# Patient Record
Sex: MALE | Race: WHITE | NOT HISPANIC OR LATINO | URBAN - METROPOLITAN AREA
[De-identification: names, ages, dates, MRNs, and addresses within clinical notes are randomized per-mention and may not be internally consistent; named-entity substitution may affect disease eponyms.]

---

## 2018-09-08 ENCOUNTER — EMERGENCY (EMERGENCY)
Facility: HOSPITAL | Age: 68
LOS: 1 days | End: 2018-09-08
Payer: COMMERCIAL

## 2018-09-08 PROCEDURE — 71045 X-RAY EXAM CHEST 1 VIEW: CPT | Mod: 26

## 2018-09-08 PROCEDURE — 99285 EMERGENCY DEPT VISIT HI MDM: CPT

## 2018-12-18 ENCOUNTER — NON-APPOINTMENT (OUTPATIENT)
Age: 68
End: 2018-12-18

## 2018-12-18 ENCOUNTER — APPOINTMENT (OUTPATIENT)
Dept: PULMONOLOGY | Facility: CLINIC | Age: 68
End: 2018-12-18
Payer: COMMERCIAL

## 2018-12-18 VITALS
BODY MASS INDEX: 32.78 KG/M2 | WEIGHT: 204 LBS | HEART RATE: 77 BPM | DIASTOLIC BLOOD PRESSURE: 74 MMHG | SYSTOLIC BLOOD PRESSURE: 110 MMHG | HEIGHT: 66 IN | RESPIRATION RATE: 14 BRPM | OXYGEN SATURATION: 97 %

## 2018-12-18 DIAGNOSIS — Z00.00 ENCOUNTER FOR GENERAL ADULT MEDICAL EXAMINATION W/OUT ABNORMAL FINDINGS: ICD-10-CM

## 2018-12-18 DIAGNOSIS — E11.9 TYPE 2 DIABETES MELLITUS W/OUT COMPLICATIONS: ICD-10-CM

## 2018-12-18 DIAGNOSIS — E03.9 HYPOTHYROIDISM, UNSPECIFIED: ICD-10-CM

## 2018-12-18 DIAGNOSIS — E11.29 TYPE 2 DIABETES MELLITUS WITH OTHER DIABETIC KIDNEY COMPLICATION: ICD-10-CM

## 2018-12-18 PROCEDURE — 94010 BREATHING CAPACITY TEST: CPT

## 2018-12-18 PROCEDURE — 99204 OFFICE O/P NEW MOD 45 MIN: CPT | Mod: 25

## 2018-12-18 RX ORDER — MONTELUKAST SODIUM 10 MG/1
10 TABLET, FILM COATED ORAL
Refills: 0 | Status: ACTIVE | COMMUNITY

## 2018-12-18 RX ORDER — EMPAGLIFLOZIN 10 MG/1
10 TABLET, FILM COATED ORAL
Refills: 0 | Status: ACTIVE | COMMUNITY

## 2018-12-18 RX ORDER — OLOPATADINE HYDROCHLORIDE 665 UG/1
0.6 SPRAY, METERED NASAL
Qty: 1 | Refills: 2 | Status: ACTIVE | COMMUNITY
Start: 2018-12-18 | End: 1900-01-01

## 2018-12-18 RX ORDER — LEVOTHYROXINE SODIUM 75 UG/1
75 TABLET ORAL
Refills: 0 | Status: ACTIVE | COMMUNITY

## 2018-12-18 RX ORDER — LISINOPRIL 5 MG/1
5 TABLET ORAL
Refills: 0 | Status: ACTIVE | COMMUNITY

## 2019-01-01 ENCOUNTER — INPATIENT (INPATIENT)
Facility: HOSPITAL | Age: 69
LOS: 1 days | Discharge: LEFT BEFORE DISCHARGE | DRG: 251 | End: 2019-01-03
Attending: INTERNAL MEDICINE | Admitting: INTERNAL MEDICINE
Payer: MEDICARE

## 2019-01-01 VITALS
OXYGEN SATURATION: 99 % | HEART RATE: 92 BPM | RESPIRATION RATE: 19 BRPM | DIASTOLIC BLOOD PRESSURE: 120 MMHG | SYSTOLIC BLOOD PRESSURE: 174 MMHG

## 2019-01-01 DIAGNOSIS — I21.3 ST ELEVATION (STEMI) MYOCARDIAL INFARCTION OF UNSPECIFIED SITE: ICD-10-CM

## 2019-01-01 DIAGNOSIS — E03.9 HYPOTHYROIDISM, UNSPECIFIED: ICD-10-CM

## 2019-01-01 DIAGNOSIS — E11.9 TYPE 2 DIABETES MELLITUS WITHOUT COMPLICATIONS: ICD-10-CM

## 2019-01-01 DIAGNOSIS — J30.2 OTHER SEASONAL ALLERGIC RHINITIS: ICD-10-CM

## 2019-01-01 LAB
ALBUMIN SERPL ELPH-MCNC: 4.1 G/DL — SIGNIFICANT CHANGE UP (ref 3.3–5)
ALBUMIN SERPL ELPH-MCNC: 4.3 G/DL — SIGNIFICANT CHANGE UP (ref 3.3–5)
ALBUMIN SERPL ELPH-MCNC: 4.8 G/DL — SIGNIFICANT CHANGE UP (ref 3.3–5)
ALP SERPL-CCNC: 56 U/L — SIGNIFICANT CHANGE UP (ref 40–120)
ALP SERPL-CCNC: 59 U/L — SIGNIFICANT CHANGE UP (ref 40–120)
ALP SERPL-CCNC: 69 U/L — SIGNIFICANT CHANGE UP (ref 40–120)
ALT FLD-CCNC: 26 U/L — SIGNIFICANT CHANGE UP (ref 10–45)
ALT FLD-CCNC: 54 U/L — HIGH (ref 10–45)
ALT FLD-CCNC: 59 U/L — HIGH (ref 10–45)
ANION GAP SERPL CALC-SCNC: 13 MMOL/L — SIGNIFICANT CHANGE UP (ref 5–17)
ANION GAP SERPL CALC-SCNC: 15 MMOL/L — SIGNIFICANT CHANGE UP (ref 5–17)
ANION GAP SERPL CALC-SCNC: 18 MMOL/L — HIGH (ref 5–17)
APPEARANCE UR: CLEAR — SIGNIFICANT CHANGE UP
APTT BLD: 30.4 SEC — SIGNIFICANT CHANGE UP (ref 27.5–36.3)
AST SERPL-CCNC: 25 U/L — SIGNIFICANT CHANGE UP (ref 10–40)
AST SERPL-CCNC: 492 U/L — HIGH (ref 10–40)
AST SERPL-CCNC: 498 U/L — HIGH (ref 10–40)
BACTERIA # UR AUTO: NEGATIVE — SIGNIFICANT CHANGE UP
BASOPHILS # BLD AUTO: 0 K/UL — SIGNIFICANT CHANGE UP (ref 0–0.2)
BASOPHILS NFR BLD AUTO: 0.1 % — SIGNIFICANT CHANGE UP (ref 0–2)
BASOPHILS NFR BLD AUTO: 0.3 % — SIGNIFICANT CHANGE UP (ref 0–2)
BASOPHILS NFR BLD AUTO: 0.6 % — SIGNIFICANT CHANGE UP (ref 0–2)
BILIRUB SERPL-MCNC: 0.3 MG/DL — SIGNIFICANT CHANGE UP (ref 0.2–1.2)
BILIRUB SERPL-MCNC: 0.3 MG/DL — SIGNIFICANT CHANGE UP (ref 0.2–1.2)
BILIRUB SERPL-MCNC: 0.4 MG/DL — SIGNIFICANT CHANGE UP (ref 0.2–1.2)
BILIRUB UR-MCNC: NEGATIVE — SIGNIFICANT CHANGE UP
BLD GP AB SCN SERPL QL: NEGATIVE — SIGNIFICANT CHANGE UP
BUN SERPL-MCNC: 16 MG/DL — SIGNIFICANT CHANGE UP (ref 7–23)
BUN SERPL-MCNC: 20 MG/DL — SIGNIFICANT CHANGE UP (ref 7–23)
BUN SERPL-MCNC: 22 MG/DL — SIGNIFICANT CHANGE UP (ref 7–23)
CALCIUM SERPL-MCNC: 8.9 MG/DL — SIGNIFICANT CHANGE UP (ref 8.4–10.5)
CALCIUM SERPL-MCNC: 9 MG/DL — SIGNIFICANT CHANGE UP (ref 8.4–10.5)
CALCIUM SERPL-MCNC: 9.7 MG/DL — SIGNIFICANT CHANGE UP (ref 8.4–10.5)
CHLORIDE SERPL-SCNC: 104 MMOL/L — SIGNIFICANT CHANGE UP (ref 96–108)
CHLORIDE SERPL-SCNC: 106 MMOL/L — SIGNIFICANT CHANGE UP (ref 96–108)
CHLORIDE SERPL-SCNC: 107 MMOL/L — SIGNIFICANT CHANGE UP (ref 96–108)
CHOLEST SERPL-MCNC: 168 MG/DL — SIGNIFICANT CHANGE UP (ref 10–199)
CK MB BLD-MCNC: 8.5 % — HIGH (ref 0–3.5)
CK MB BLD-MCNC: 8.9 % — HIGH (ref 0–3.5)
CK MB CFR SERPL CALC: 312.4 NG/ML — HIGH (ref 0–6.7)
CK MB CFR SERPL CALC: 381.5 NG/ML — HIGH (ref 0–6.7)
CK SERPL-CCNC: 3666 U/L — HIGH (ref 30–200)
CK SERPL-CCNC: 4299 U/L — HIGH (ref 30–200)
CO2 SERPL-SCNC: 18 MMOL/L — LOW (ref 22–31)
CO2 SERPL-SCNC: 18 MMOL/L — LOW (ref 22–31)
CO2 SERPL-SCNC: 23 MMOL/L — SIGNIFICANT CHANGE UP (ref 22–31)
COLOR SPEC: SIGNIFICANT CHANGE UP
CREAT SERPL-MCNC: 0.69 MG/DL — SIGNIFICANT CHANGE UP (ref 0.5–1.3)
CREAT SERPL-MCNC: 0.78 MG/DL — SIGNIFICANT CHANGE UP (ref 0.5–1.3)
CREAT SERPL-MCNC: 0.97 MG/DL — SIGNIFICANT CHANGE UP (ref 0.5–1.3)
DIFF PNL FLD: ABNORMAL
EOSINOPHIL # BLD AUTO: 0 K/UL — SIGNIFICANT CHANGE UP (ref 0–0.5)
EOSINOPHIL # BLD AUTO: 0.1 K/UL — SIGNIFICANT CHANGE UP (ref 0–0.5)
EOSINOPHIL # BLD AUTO: 0.3 K/UL — SIGNIFICANT CHANGE UP (ref 0–0.5)
EOSINOPHIL NFR BLD AUTO: 0.3 % — SIGNIFICANT CHANGE UP (ref 0–6)
EOSINOPHIL NFR BLD AUTO: 0.8 % — SIGNIFICANT CHANGE UP (ref 0–6)
EOSINOPHIL NFR BLD AUTO: 3.3 % — SIGNIFICANT CHANGE UP (ref 0–6)
EPI CELLS # UR: 1 /HPF — SIGNIFICANT CHANGE UP
GLUCOSE SERPL-MCNC: 132 MG/DL — HIGH (ref 70–99)
GLUCOSE SERPL-MCNC: 138 MG/DL — HIGH (ref 70–99)
GLUCOSE SERPL-MCNC: 152 MG/DL — HIGH (ref 70–99)
GLUCOSE UR QL: ABNORMAL
HBA1C BLD-MCNC: 6.1 % — HIGH (ref 4–5.6)
HCT VFR BLD CALC: 47.1 % — SIGNIFICANT CHANGE UP (ref 39–50)
HCT VFR BLD CALC: 48.5 % — SIGNIFICANT CHANGE UP (ref 39–50)
HCT VFR BLD CALC: 49.9 % — SIGNIFICANT CHANGE UP (ref 39–50)
HDLC SERPL-MCNC: 43 MG/DL — SIGNIFICANT CHANGE UP
HGB BLD-MCNC: 16.2 G/DL — SIGNIFICANT CHANGE UP (ref 13–17)
HGB BLD-MCNC: 16.4 G/DL — SIGNIFICANT CHANGE UP (ref 13–17)
HGB BLD-MCNC: 16.5 G/DL — SIGNIFICANT CHANGE UP (ref 13–17)
HYALINE CASTS # UR AUTO: 2 /LPF — SIGNIFICANT CHANGE UP (ref 0–2)
INR BLD: 0.97 RATIO — SIGNIFICANT CHANGE UP (ref 0.88–1.16)
KETONES UR-MCNC: ABNORMAL
LEUKOCYTE ESTERASE UR-ACNC: NEGATIVE — SIGNIFICANT CHANGE UP
LIPID PNL WITH DIRECT LDL SERPL: 93 MG/DL — SIGNIFICANT CHANGE UP
LYMPHOCYTES # BLD AUTO: 0.6 K/UL — LOW (ref 1–3.3)
LYMPHOCYTES # BLD AUTO: 1.5 K/UL — SIGNIFICANT CHANGE UP (ref 1–3.3)
LYMPHOCYTES # BLD AUTO: 13.5 % — SIGNIFICANT CHANGE UP (ref 13–44)
LYMPHOCYTES # BLD AUTO: 2 K/UL — SIGNIFICANT CHANGE UP (ref 1–3.3)
LYMPHOCYTES # BLD AUTO: 27 % — SIGNIFICANT CHANGE UP (ref 13–44)
LYMPHOCYTES # BLD AUTO: 6 % — LOW (ref 13–44)
MAGNESIUM SERPL-MCNC: 2 MG/DL — SIGNIFICANT CHANGE UP (ref 1.6–2.6)
MAGNESIUM SERPL-MCNC: 2 MG/DL — SIGNIFICANT CHANGE UP (ref 1.6–2.6)
MCHC RBC-ENTMCNC: 30.6 PG — SIGNIFICANT CHANGE UP (ref 27–34)
MCHC RBC-ENTMCNC: 31.2 PG — SIGNIFICANT CHANGE UP (ref 27–34)
MCHC RBC-ENTMCNC: 31.4 PG — SIGNIFICANT CHANGE UP (ref 27–34)
MCHC RBC-ENTMCNC: 33 GM/DL — SIGNIFICANT CHANGE UP (ref 32–36)
MCHC RBC-ENTMCNC: 33.9 GM/DL — SIGNIFICANT CHANGE UP (ref 32–36)
MCHC RBC-ENTMCNC: 34.4 GM/DL — SIGNIFICANT CHANGE UP (ref 32–36)
MCV RBC AUTO: 91.4 FL — SIGNIFICANT CHANGE UP (ref 80–100)
MCV RBC AUTO: 91.8 FL — SIGNIFICANT CHANGE UP (ref 80–100)
MCV RBC AUTO: 92.6 FL — SIGNIFICANT CHANGE UP (ref 80–100)
MONOCYTES # BLD AUTO: 0.6 K/UL — SIGNIFICANT CHANGE UP (ref 0–0.9)
MONOCYTES # BLD AUTO: 0.7 K/UL — SIGNIFICANT CHANGE UP (ref 0–0.9)
MONOCYTES # BLD AUTO: 0.8 K/UL — SIGNIFICANT CHANGE UP (ref 0–0.9)
MONOCYTES NFR BLD AUTO: 5.8 % — SIGNIFICANT CHANGE UP (ref 2–14)
MONOCYTES NFR BLD AUTO: 7.1 % — SIGNIFICANT CHANGE UP (ref 2–14)
MONOCYTES NFR BLD AUTO: 9.9 % — SIGNIFICANT CHANGE UP (ref 2–14)
NEUTROPHILS # BLD AUTO: 4.5 K/UL — SIGNIFICANT CHANGE UP (ref 1.8–7.4)
NEUTROPHILS # BLD AUTO: 8.9 K/UL — HIGH (ref 1.8–7.4)
NEUTROPHILS # BLD AUTO: 9.3 K/UL — HIGH (ref 1.8–7.4)
NEUTROPHILS NFR BLD AUTO: 59.3 % — SIGNIFICANT CHANGE UP (ref 43–77)
NEUTROPHILS NFR BLD AUTO: 78.3 % — HIGH (ref 43–77)
NEUTROPHILS NFR BLD AUTO: 87.9 % — HIGH (ref 43–77)
NITRITE UR-MCNC: NEGATIVE — SIGNIFICANT CHANGE UP
PCP SPEC-MCNC: SIGNIFICANT CHANGE UP
PH UR: 5.5 — SIGNIFICANT CHANGE UP (ref 5–8)
PHOSPHATE SERPL-MCNC: 2.7 MG/DL — SIGNIFICANT CHANGE UP (ref 2.5–4.5)
PHOSPHATE SERPL-MCNC: 2.9 MG/DL — SIGNIFICANT CHANGE UP (ref 2.5–4.5)
PLATELET # BLD AUTO: 180 K/UL — SIGNIFICANT CHANGE UP (ref 150–400)
PLATELET # BLD AUTO: 198 K/UL — SIGNIFICANT CHANGE UP (ref 150–400)
PLATELET # BLD AUTO: 211 K/UL — SIGNIFICANT CHANGE UP (ref 150–400)
POTASSIUM SERPL-MCNC: 3.5 MMOL/L — SIGNIFICANT CHANGE UP (ref 3.5–5.3)
POTASSIUM SERPL-MCNC: 4 MMOL/L — SIGNIFICANT CHANGE UP (ref 3.5–5.3)
POTASSIUM SERPL-MCNC: 4 MMOL/L — SIGNIFICANT CHANGE UP (ref 3.5–5.3)
POTASSIUM SERPL-SCNC: 3.5 MMOL/L — SIGNIFICANT CHANGE UP (ref 3.5–5.3)
POTASSIUM SERPL-SCNC: 4 MMOL/L — SIGNIFICANT CHANGE UP (ref 3.5–5.3)
POTASSIUM SERPL-SCNC: 4 MMOL/L — SIGNIFICANT CHANGE UP (ref 3.5–5.3)
PROT SERPL-MCNC: 6.7 G/DL — SIGNIFICANT CHANGE UP (ref 6–8.3)
PROT SERPL-MCNC: 6.8 G/DL — SIGNIFICANT CHANGE UP (ref 6–8.3)
PROT SERPL-MCNC: 7.4 G/DL — SIGNIFICANT CHANGE UP (ref 6–8.3)
PROT UR-MCNC: ABNORMAL
PROTHROM AB SERPL-ACNC: 11 SEC — SIGNIFICANT CHANGE UP (ref 10–12.9)
RBC # BLD: 5.15 M/UL — SIGNIFICANT CHANGE UP (ref 4.2–5.8)
RBC # BLD: 5.28 M/UL — SIGNIFICANT CHANGE UP (ref 4.2–5.8)
RBC # BLD: 5.38 M/UL — SIGNIFICANT CHANGE UP (ref 4.2–5.8)
RBC # FLD: 13.2 % — SIGNIFICANT CHANGE UP (ref 10.3–14.5)
RBC # FLD: 13.3 % — SIGNIFICANT CHANGE UP (ref 10.3–14.5)
RBC # FLD: 13.6 % — SIGNIFICANT CHANGE UP (ref 10.3–14.5)
RBC CASTS # UR COMP ASSIST: 1 /HPF — SIGNIFICANT CHANGE UP (ref 0–4)
RH IG SCN BLD-IMP: POSITIVE — SIGNIFICANT CHANGE UP
SODIUM SERPL-SCNC: 138 MMOL/L — SIGNIFICANT CHANGE UP (ref 135–145)
SODIUM SERPL-SCNC: 139 MMOL/L — SIGNIFICANT CHANGE UP (ref 135–145)
SODIUM SERPL-SCNC: 145 MMOL/L — SIGNIFICANT CHANGE UP (ref 135–145)
SP GR SPEC: >1.05 (ref 1.01–1.02)
TOTAL CHOLESTEROL/HDL RATIO MEASUREMENT: 3.9 RATIO — SIGNIFICANT CHANGE UP (ref 3.4–9.6)
TRIGL SERPL-MCNC: 162 MG/DL — HIGH (ref 10–149)
TROPONIN T, HIGH SENSITIVITY RESULT: 11 NG/L — SIGNIFICANT CHANGE UP (ref 0–51)
TROPONIN T, HIGH SENSITIVITY RESULT: 6435 NG/L — HIGH (ref 0–51)
TROPONIN T, HIGH SENSITIVITY RESULT: 8141 NG/L — HIGH (ref 0–51)
TSH SERPL-MCNC: 3.92 UIU/ML — SIGNIFICANT CHANGE UP (ref 0.27–4.2)
UROBILINOGEN FLD QL: NEGATIVE — SIGNIFICANT CHANGE UP
WBC # BLD: 10.6 K/UL — HIGH (ref 3.8–10.5)
WBC # BLD: 11.4 K/UL — HIGH (ref 3.8–10.5)
WBC # BLD: 7.6 K/UL — SIGNIFICANT CHANGE UP (ref 3.8–10.5)
WBC # FLD AUTO: 10.6 K/UL — HIGH (ref 3.8–10.5)
WBC # FLD AUTO: 11.4 K/UL — HIGH (ref 3.8–10.5)
WBC # FLD AUTO: 7.6 K/UL — SIGNIFICANT CHANGE UP (ref 3.8–10.5)
WBC UR QL: 2 /HPF — SIGNIFICANT CHANGE UP (ref 0–5)

## 2019-01-01 PROCEDURE — 70498 CT ANGIOGRAPHY NECK: CPT | Mod: 26

## 2019-01-01 PROCEDURE — 70496 CT ANGIOGRAPHY HEAD: CPT | Mod: 26

## 2019-01-01 PROCEDURE — 92921: CPT | Mod: LD,GC

## 2019-01-01 PROCEDURE — 70450 CT HEAD/BRAIN W/O DYE: CPT | Mod: 26,59

## 2019-01-01 PROCEDURE — 99285 EMERGENCY DEPT VISIT HI MDM: CPT | Mod: 25

## 2019-01-01 PROCEDURE — 99152 MOD SED SAME PHYS/QHP 5/>YRS: CPT | Mod: GC

## 2019-01-01 PROCEDURE — 93010 ELECTROCARDIOGRAM REPORT: CPT | Mod: 76

## 2019-01-01 PROCEDURE — 92978 ENDOLUMINL IVUS OCT C 1ST: CPT | Mod: 26,LD,GC

## 2019-01-01 PROCEDURE — 93010 ELECTROCARDIOGRAM REPORT: CPT | Mod: 59

## 2019-01-01 PROCEDURE — 92941 PRQ TRLML REVSC TOT OCCL AMI: CPT | Mod: LD,GC

## 2019-01-01 PROCEDURE — 99291 CRITICAL CARE FIRST HOUR: CPT

## 2019-01-01 PROCEDURE — 93458 L HRT ARTERY/VENTRICLE ANGIO: CPT | Mod: 26,59,GC

## 2019-01-01 PROCEDURE — 71045 X-RAY EXAM CHEST 1 VIEW: CPT | Mod: 26

## 2019-01-01 RX ORDER — SODIUM CHLORIDE 9 MG/ML
500 INJECTION INTRAMUSCULAR; INTRAVENOUS; SUBCUTANEOUS ONCE
Qty: 0 | Refills: 0 | Status: DISCONTINUED | OUTPATIENT
Start: 2019-01-01 | End: 2019-01-01

## 2019-01-01 RX ORDER — TICAGRELOR 90 MG/1
90 TABLET ORAL
Qty: 0 | Refills: 0 | Status: DISCONTINUED | OUTPATIENT
Start: 2019-01-02 | End: 2019-01-03

## 2019-01-01 RX ORDER — SODIUM CHLORIDE 9 MG/ML
250 INJECTION INTRAMUSCULAR; INTRAVENOUS; SUBCUTANEOUS ONCE
Qty: 0 | Refills: 0 | Status: COMPLETED | OUTPATIENT
Start: 2019-01-01 | End: 2019-01-01

## 2019-01-01 RX ORDER — ATORVASTATIN CALCIUM 80 MG/1
80 TABLET, FILM COATED ORAL AT BEDTIME
Qty: 0 | Refills: 0 | Status: DISCONTINUED | OUTPATIENT
Start: 2019-01-01 | End: 2019-01-01

## 2019-01-01 RX ORDER — ROSUVASTATIN CALCIUM 5 MG/1
40 TABLET ORAL AT BEDTIME
Qty: 0 | Refills: 0 | Status: DISCONTINUED | OUTPATIENT
Start: 2019-01-01 | End: 2019-01-03

## 2019-01-01 RX ORDER — MONTELUKAST 4 MG/1
10 TABLET, CHEWABLE ORAL DAILY
Qty: 0 | Refills: 0 | Status: DISCONTINUED | OUTPATIENT
Start: 2019-01-01 | End: 2019-01-03

## 2019-01-01 RX ORDER — TICAGRELOR 90 MG/1
180 TABLET ORAL ONCE
Qty: 0 | Refills: 0 | Status: COMPLETED | OUTPATIENT
Start: 2019-01-01 | End: 2019-01-01

## 2019-01-01 RX ORDER — METOPROLOL TARTRATE 50 MG
12.5 TABLET ORAL
Qty: 0 | Refills: 0 | Status: DISCONTINUED | OUTPATIENT
Start: 2019-01-01 | End: 2019-01-02

## 2019-01-01 RX ORDER — HEPARIN SODIUM 5000 [USP'U]/ML
4000 INJECTION INTRAVENOUS; SUBCUTANEOUS EVERY 6 HOURS
Qty: 0 | Refills: 0 | Status: DISCONTINUED | OUTPATIENT
Start: 2019-01-01 | End: 2019-01-01

## 2019-01-01 RX ORDER — FAMOTIDINE 10 MG/ML
20 INJECTION INTRAVENOUS DAILY
Qty: 0 | Refills: 0 | Status: DISCONTINUED | OUTPATIENT
Start: 2019-01-01 | End: 2019-01-03

## 2019-01-01 RX ORDER — LEVOTHYROXINE SODIUM 125 MCG
75 TABLET ORAL DAILY
Qty: 0 | Refills: 0 | Status: DISCONTINUED | OUTPATIENT
Start: 2019-01-01 | End: 2019-01-03

## 2019-01-01 RX ORDER — HEPARIN SODIUM 5000 [USP'U]/ML
INJECTION INTRAVENOUS; SUBCUTANEOUS
Qty: 25000 | Refills: 0 | Status: DISCONTINUED | OUTPATIENT
Start: 2019-01-01 | End: 2019-01-01

## 2019-01-01 RX ORDER — HEPARIN SODIUM 5000 [USP'U]/ML
5000 INJECTION INTRAVENOUS; SUBCUTANEOUS ONCE
Qty: 0 | Refills: 0 | Status: COMPLETED | OUTPATIENT
Start: 2019-01-01 | End: 2019-01-01

## 2019-01-01 RX ORDER — LISINOPRIL 2.5 MG/1
5 TABLET ORAL DAILY
Qty: 0 | Refills: 0 | Status: DISCONTINUED | OUTPATIENT
Start: 2019-01-01 | End: 2019-01-03

## 2019-01-01 RX ORDER — HEPARIN SODIUM 5000 [USP'U]/ML
4000 INJECTION INTRAVENOUS; SUBCUTANEOUS EVERY 6 HOURS
Qty: 0 | Refills: 0 | Status: DISCONTINUED | OUTPATIENT
Start: 2019-01-01 | End: 2019-01-03

## 2019-01-01 RX ORDER — ALBUTEROL 90 UG/1
2 AEROSOL, METERED ORAL EVERY 6 HOURS
Qty: 0 | Refills: 0 | Status: DISCONTINUED | OUTPATIENT
Start: 2019-01-01 | End: 2019-01-02

## 2019-01-01 RX ORDER — HEPARIN SODIUM 5000 [USP'U]/ML
INJECTION INTRAVENOUS; SUBCUTANEOUS
Qty: 25000 | Refills: 0 | Status: DISCONTINUED | OUTPATIENT
Start: 2019-01-01 | End: 2019-01-03

## 2019-01-01 RX ORDER — ACETAMINOPHEN 500 MG
650 TABLET ORAL EVERY 6 HOURS
Qty: 0 | Refills: 0 | Status: DISCONTINUED | OUTPATIENT
Start: 2019-01-01 | End: 2019-01-03

## 2019-01-01 RX ORDER — CHLORHEXIDINE GLUCONATE 213 G/1000ML
1 SOLUTION TOPICAL
Qty: 0 | Refills: 0 | Status: DISCONTINUED | OUTPATIENT
Start: 2019-01-01 | End: 2019-01-03

## 2019-01-01 RX ORDER — TICAGRELOR 90 MG/1
90 TABLET ORAL
Qty: 0 | Refills: 0 | Status: DISCONTINUED | OUTPATIENT
Start: 2019-01-01 | End: 2019-01-01

## 2019-01-01 RX ORDER — PANTOPRAZOLE SODIUM 20 MG/1
40 TABLET, DELAYED RELEASE ORAL
Qty: 0 | Refills: 0 | Status: DISCONTINUED | OUTPATIENT
Start: 2019-01-01 | End: 2019-01-03

## 2019-01-01 RX ADMIN — ROSUVASTATIN CALCIUM 40 MILLIGRAM(S): 5 TABLET ORAL at 23:24

## 2019-01-01 RX ADMIN — HEPARIN SODIUM 5000 UNIT(S): 5000 INJECTION INTRAVENOUS; SUBCUTANEOUS at 11:14

## 2019-01-01 RX ADMIN — LISINOPRIL 5 MILLIGRAM(S): 2.5 TABLET ORAL at 17:56

## 2019-01-01 RX ADMIN — TICAGRELOR 180 MILLIGRAM(S): 90 TABLET ORAL at 11:16

## 2019-01-01 RX ADMIN — Medication 650 MILLIGRAM(S): at 21:20

## 2019-01-01 RX ADMIN — Medication 650 MILLIGRAM(S): at 20:45

## 2019-01-01 RX ADMIN — SODIUM CHLORIDE 250 MILLILITER(S): 9 INJECTION INTRAMUSCULAR; INTRAVENOUS; SUBCUTANEOUS at 20:45

## 2019-01-01 RX ADMIN — Medication 12.5 MILLIGRAM(S): at 17:10

## 2019-01-01 RX ADMIN — HEPARIN SODIUM 1000 UNIT(S)/HR: 5000 INJECTION INTRAVENOUS; SUBCUTANEOUS at 23:25

## 2019-01-01 RX ADMIN — HEPARIN SODIUM 1000 UNIT(S)/HR: 5000 INJECTION INTRAVENOUS; SUBCUTANEOUS at 11:15

## 2019-01-01 NOTE — H&P ADULT - NSHPPHYSICALEXAM_GEN_ALL_CORE
ICU Vital Signs Last 24 Hrs  T(C): 36.7 (01 Jan 2019 11:20), Max: 36.7 (01 Jan 2019 11:20)  T(F): 98 (01 Jan 2019 11:20), Max: 98 (01 Jan 2019 11:20)  HR: 94 (01 Jan 2019 13:09) (92 - 94)  BP: 136/76 (01 Jan 2019 13:09) (136/76 - 181/117)  BP(mean): 104 (01 Jan 2019 13:09) (104 - 104)  ABP: --  ABP(mean): --  RR: 18 (01 Jan 2019 13:09) (17 - 20)  SpO2: 92% (01 Jan 2019 13:09) (92% - 100%)      PHYSICAL EXAM:    GENERAL: Comfortable, no acute distress   HEAD:  Normocephalic, atraumatic  EYES: EOMI, PERRLA  HEENT: Moist mucous membranes  NECK: Supple, No JVD  NERVOUS SYSTEM:  CN 2-12 intact b/l. Alert & Oriented X3, Motor Strength 5/5 B/L upper and lower extremities. Sensation intact B/L  CHEST/LUNG: Clear to auscultation bilaterally  HEART: Regular rate and rhythm, no murmur   ABDOMEN: Soft, Nontender, Nondistended, Bowel sounds present  EXTREMITIES:   No clubbing, cyanosis, or edema  MUSCULOSKELETAL: No muscle tenderness, no joint tenderness  SKIN: warm and dry, no rash ICU Vital Signs Last 24 Hrs  T(C): 36.7 (01 Jan 2019 11:20), Max: 36.7 (01 Jan 2019 11:20)  T(F): 98 (01 Jan 2019 11:20), Max: 98 (01 Jan 2019 11:20)  HR: 94 (01 Jan 2019 13:09) (92 - 94)  BP: 136/76 (01 Jan 2019 13:09) (136/76 - 181/117)  BP(mean): 104 (01 Jan 2019 13:09) (104 - 104)  ABP: --  ABP(mean): --  RR: 18 (01 Jan 2019 13:09) (17 - 20)  SpO2: 92% (01 Jan 2019 13:09) (92% - 100%)      PHYSICAL EXAM:    GENERAL: Comfortable, no acute distress   HEAD:  Normocephalic, atraumatic  EYES: EOMI, PERRLA  HEENT: Moist mucous membranes  NECK: Supple, No JVD  NERVOUS SYSTEM: AAOx3, no focal deficits noted   CHEST/LUNG: Clear to auscultation bilaterally  HEART: Regular rate and rhythm, no murmurs   ABDOMEN: +BS, soft, non tender, non distended  EXTREMITIES:   No clubbing, cyanosis, or edema, R radial access site clean without hematoma  MUSCULOSKELETAL: No muscle tenderness, no joint tenderness  SKIN: warm and dry, no rash

## 2019-01-01 NOTE — ED PROVIDER NOTE - ATTENDING CONTRIBUTION TO CARE
I performed a history and physical exam of the patient and discussed their management with the resident. I reviewed the resident's note and agree with the documented findings and plan of care, except if noted below. My medical decision making and observations can be found be found below.    67 yo male hx of CAD sp stents presents with chest pain and SOB. Acute STEMI on EKG. Pt took 81 mg aspirin x 5 today. Stat cards consult, labs, hep, Brilinta, admit for cath.

## 2019-01-01 NOTE — ED PROVIDER NOTE - MEDICAL DECISION MAKING DETAILS
see attending attestation 67 yo male hx of CAD sp stents presents with chest pain and SOB. Acute STEMI on EKG. Pt took 81 mg aspirin x 5 today. Stat cards consult, labs, hep, Brilinta, admit for cath.

## 2019-01-01 NOTE — ED PROVIDER NOTE - PHYSICAL EXAMINATION
Gen: Ill appearing, diaphoretic  Head: NCAT  HEENT: MMM, Normal conjunctiva  Lung: CTAB, no rales, rhonchi or wheezing  CV: RRR, no murmurs, rubs or gallops  Abd: soft, NTND, no rebound or guarding  MSK:  No edema, no visible deformities  Neuro: No focal neurologic deficits.   Skin: Warm and dry, no evidence of rash  Psych: normal mood and affect, A&Ox3

## 2019-01-01 NOTE — CONSULT NOTE ADULT - PROBLEM SELECTOR RECOMMENDATION 9
Johnie eval  s/p PCI with POBA to the LAD and Diag   currently on heparin drip with F/U PTT   continue DAPT, statin  continue BP medications   follow up echo to evaluate LV function.

## 2019-01-01 NOTE — H&P ADULT - NSHPREVIEWOFSYSTEMS_GEN_ALL_CORE
REVIEW OF SYSTEMS    CONSTITUTIONAL:  Denies fevers or chills  HEENT: Denies vision changes, double vision  SKIN:  Denies rash or itching.  CARDIOVASCULAR:  Denies chest pain, BALWINDER  RESPIRATORY:  Denies shortness of breath, cough or sputum.  GASTROINTESTINAL:  Denies abdominal pain, nausea, vomiting, diarrhea   GENITOURINARY:  Denies any hematuria, dysuria  NEUROLOGICAL:  Denies headache, dizziness, numbness, tingling   MUSCULOSKELETAL:  Denies muscle, back pain, joint pain or stiffness.  HEMATOLOGIC:  Denies anemia, bleeding or bruising.  LYMPHATICS:  Denies enlarged nodes.   PSYCHIATRIC:  Denies history of depression or anxiety.  ENDOCRINOLOGIC:  Denies reports of sweating, cold or heat intolerance. No polyuria or polydipsia.  ALLERGIES:  Denies history of asthma, hives, eczema or rhinitis.

## 2019-01-01 NOTE — ED PROVIDER NOTE - NS ED ROS FT
ROS: denies HA, weakness, dizziness, fevers/chills, nausea/vomiting, diaphoresis, abdominal pain, back/neck pain, dysuria/hematuria, or rash  +chest pain, sob

## 2019-01-01 NOTE — CONSULT NOTE ADULT - ASSESSMENT
68M w/ PMHx of CAD (s/p PCI x 7 3.5yrs ago at [place]), DM2, Hypothyroid who presented to the ED c/o 5/10 substernal CP, ECG w/ GERMANIA in I, aVL, V2-V5. s/p  at home. Received Brilinta 180 and Heparin 5000U in ED.    Plan:     1. CAD w/ current STEMI  -LHC now, then admit to CCU  -Tele  -TTE  -HbA1c, TSH, FLP  -c/w Crestor 40  -Hold ACEI pending Cr  -c/w DAPT    GILBERTO Pena  Cardiology Fellow
Impression:    67 y/o M with a PMH significant for CAD (s/p PCI x 7 at [place] 3.5 years ago), DM2, and hypothyroidism, MI, experienced transient episode of anteriograde memory loss and mild confusion. Patient states that he feels somewhat confused, but is alert and oriented x3, in no acute distress. Currently returning back to baseline.     TGA vs. Delirium vs. Acute Stress Reaction    Plan:  - Continue observation   - q4 Neuro Checks   - STAT CT/CTA H/N if acute mental status changes noted.   - MRI w/o H/N, MRA w/wo Head.
patient is a 69 y/o male admitted for acute STEMI

## 2019-01-01 NOTE — CONSULT NOTE ADULT - SUBJECTIVE AND OBJECTIVE BOX
Neurology  Consult Note  01-02-19    Name:  CORINE DAVIES; 68y (1950)    Reason for Admission: ST elevation myocardial infarction (STEMI)      Chief Complaint:  HPI:  This is a 67 y/o M with a PMH significant for CAD (s/p PCI x 7 at [facility] 3.5 years ago), DM2, and hypothyroidism who presented to the ED with 5/10 substernal CP that started after exercising on the elliptical this morning. Of note, the patient was away on vacation and had not exercised for 12 days. He did 30 minutes on the elliptical and noticed that his HR was elevated to the 130s instead of the usual 120s but he did not experience any symptoms. He finished his exercise and then ate breakfast and then began to experience 5/10 substernal chest pain radiating to his R temple and teeth (about 45 minutes later). He took 81mg ASA x 5 and lisinopril 10mg and then came to the ED, he had similar symptoms in August and went to get evaluated and was found to have high blood pressure that resolved after medications. He denied any other symptoms at the time including SOB, diaphoresis, nausea, vomiting or abdominal pain.     Of note, the patient received 7 stents at [facility] about 3.5 years ago. He was previously on plavix but tolerated it for less than 3 months. He also self discontinued his blood pressure medications earlier this year.     Upon arrival to the ED, the patient was afebrile, BP: 174/120, HR: 92, RR: 18, sating 99% on RA. EKG was done which showed ST elevations in leads I, aVL, V2-V6. STEMI alert was called and the patient was prepped for the cath lab. He received brillinta 180mg, 5000U heparin bolus, and then was started on a heparin drip.     He went to the cath lab and underwent PCI through the R radial and was found to have 99% occlusion of the proximal LAD and diag. He received POBA x 2. He was then transferred to the CCU for further management. (01 Jan 2019 13:22)    Patient presents today as a Code Stroke. Patient experienced transient episode of anteriograde memory loss and mild confusion. Patient states that he feels somewhat confused, but is alert and oriented x3, in no acute distress.     NIHSS: 0.     Review of Systems:  As states in HPI.    PMHx:   Hypothyroid  Stented coronary artery  Diabetes  High cholesterol    PFHx:   No pertinent family history in first degree relatives    PSuHx:   No significant past surgical history    Medications:  MEDICATIONS  (STANDING):  aluminum hydroxide/magnesium hydroxide/simethicone Suspension 30 milliLiter(s) Oral once  chlorhexidine 4% Liquid 1 Application(s) Topical <User Schedule>  famotidine    Tablet 20 milliGRAM(s) Oral daily  heparin  Infusion.  Unit(s)/Hr (10 mL/Hr) IV Continuous <Continuous>  levothyroxine 75 MICROGram(s) Oral daily  lisinopril 5 milliGRAM(s) Oral daily  metoprolol tartrate 25 milliGRAM(s) Oral two times a day  montelukast 10 milliGRAM(s) Oral daily  pantoprazole    Tablet 40 milliGRAM(s) Oral before breakfast  rosuvastatin 40 milliGRAM(s) Oral at bedtime  ticagrelor 90 milliGRAM(s) Oral two times a day    MEDICATIONS  (PRN):  acetaminophen   Tablet .. 650 milliGRAM(s) Oral every 6 hours PRN Temp greater or equal to 38C (100.4F), Mild Pain (1 - 3), Moderate Pain (4 - 6)  ALBUTerol    90 MICROgram(s) HFA Inhaler 2 Puff(s) Inhalation every 6 hours PRN Shortness of Breath and/or Wheezing  benzonatate 100 milliGRAM(s) Oral three times a day PRN Cough  heparin  Injectable 4000 Unit(s) IV Push every 6 hours PRN For aPTT less than 40    Vitals:  T(C): 36.7 (01-01-19 @ 23:00), Max: 36.9 (01-01-19 @ 13:20)  HR: 82 (01-02-19 @ 01:00) (74 - 94)  BP: 140/91 (01-02-19 @ 01:00) (122/84 - 181/117)  RR: 18 (01-02-19 @ 01:00) (17 - 25)  SpO2: 96% (01-02-19 @ 01:00) (92% - 100%)    Labs:                        16.2   10.6  )-----------( 180      ( 01 Jan 2019 21:59 )             47.1     01-01    139  |  106  |  16  ----------------------------<  152<H>  4.0   |  18<L>  |  0.69    Ca    8.9      01 Jan 2019 21:59  Phos  2.9     01-01  Mg     2.0     01-01    TPro  6.8  /  Alb  4.3  /  TBili  0.4  /  DBili  x   /  AST  498<H>  /  ALT  59<H>  /  AlkPhos  56  01-01    CAPILLARY BLOOD GLUCOSE  120 (01 Jan 2019 15:54)    POCT Blood Glucose.: 120 mg/dL (01 Jan 2019 15:44)    LIVER FUNCTIONS - ( 01 Jan 2019 21:59 )  Alb: 4.3 g/dL / Pro: 6.8 g/dL / ALK PHOS: 56 U/L / ALT: 59 U/L / AST: 498 U/L / GGT: x           PT/INR - ( 01 Jan 2019 11:21 )   PT: 11.0 sec;   INR: 0.97 ratio       PTT - ( 01 Jan 2019 11:21 )  PTT:30.4 sec    PHYSICAL EXAMINATION:  General: Well-developed, well nourished, in no acute distress.  Eyes: Conjunctiva and sclera clear.  Neurologic:  - Mental Status:  Mild confusion. Alert, awake, oriented to person, place, and time; Speech is fluent with intact naming, repetition, and comprehension; Good overall fund of knowledge; Immediate recall is 3/3 words and delayed recall is 0/3 words at 5 minutes   - Cranial Nerves II-XII:    II:  Visual fields are full to confrontation; Pupils are equal, round, and reactive to light;  III, IV, VI:  Extraocular movements are intact without nystagmus.  V:  Facial sensation is intact in the V1-V3 distribution bilaterally.  VII:  Face is symmetric with normal eye closure and smile  VIII:  Hearing is intact to finger rub.  IX, X:  Uvula is midline and soft palate rises symmetrically  XI:  Head turning and shoulder shrug are intact.  XII:  Tongue protudes in the midline.  - Motor:  Strength is 5/5 throughout.  There is no pronator drift.  Normal muscle bulk and tone throughout.  - Reflexes:  2+ and symmetric at the biceps, triceps, brachioradialis, knees, and ankles.  Plantar responses flexor.  - Sensory:  Intact throughout to vibration, and joint-position, light touch, pin prick.  - Coordination:  Finger-nose-finger and heel-knee-shin intact without dysmetria.  Rapid alternating hand movements intact.  - Gait:   Normal steps, base, arm swing, and turning.  Heel and toe walking are normal.  Tandem gait is normal.  Romberg testing is negative.    Radiology:  < from: CT Brain Stroke Protocol (01.01.19 @ 16:25) >  IMPRESSION:   No acute intracranial hemorrhage, mass effect, or shift of the midline   structures.     Discussed the findings with Dr. Rios at 4:36 PM on 1/1/2019 with read   back.      RICO BRITT M.D., RADIOLOGY RESIDENT  This document has been electronically signed.  JARROD NESBITT M.D., ATTENDING RADIOLOGIST  This document has been electronically signed. Jan 1 2019  4:41PM  < end of copied text >

## 2019-01-01 NOTE — H&P ADULT - NSHPSOCIALHISTORY_GEN_ALL_CORE
The patient lives with his wife at home. He works in a construction business. He denies tobacco or recreational drug use. He endorses drinking 2 vodka drinks with dinner on the weekends.

## 2019-01-01 NOTE — H&P ADULT - PROBLEM SELECTOR PLAN 1
- patient presented with chest pain, EKG with ST elevations in leads I, aVL, V2-V6 suggestive of anterior wall STEMI  - s/p PCI with POBA to the LAD and Diag for 99% stenosis  - continue heparin drip for 48 hours given high clot burden  - continue DAPT, statin  - continue lisinopril and introduce BB as tolerated   - echo in 72 hours to evaluate for thrombus

## 2019-01-01 NOTE — H&P ADULT - NSHPLABSRESULTS_GEN_ALL_CORE
LABORATORY                          16.4   7.6   )-----------( 211      ( 01 Jan 2019 11:21 )             49.9       01-01    145  |  104  |  22  ----------------------------<  138<H>  3.5   |  23  |  0.97    Ca    9.7      01 Jan 2019 11:21    TPro  7.4  /  Alb  4.8  /  TBili  0.3  /  DBili  x   /  AST  25  /  ALT  26  /  AlkPhos  69  01-01      Troponin T, High Sensitivity (01.01.19 @ 11:21)    Troponin T, High Sensitivity Result: 11: Rapid upward or downward changes in high-sensitivity troponin levels  suggest acute myocardial injury. Renal impairment may cause sustained  troponin elevations.      EKG: ST elevations in leads I, aVL, V2-V6, sinus rhythm at 90 bpm

## 2019-01-01 NOTE — H&P ADULT - HISTORY OF PRESENT ILLNESS
This is a 69 y/o M with a PMH significant for CAD (s/p PCI x 7 3.5yrs ago at [facility]), DM2, and hypothyroidism who presented to the ED with 5/10 substernal CP that started after exercising on the elliptical this morning. The patient's symptoms started 10 minutes after he stopped exercising - he normally exercises however he had not been working out for the past 10 days as he was in [place]. The pain radiated to his teeth and R temple. He took  at home. He had a similar episode in August and the pain resolved when his BP was controlled. ED Course: Brilinta 180, Heparin 5000U followed by a gtt. This is a 67 y/o M with a PMH significant for CAD (s/p PCI x 7 at [facility] 3.5 years ago), DM2, and hypothyroidism who presented to the ED with 5/10 substernal CP that started after exercising on the elliptical this morning. Of note, the patient was away on vacation and had not exercised for 12 days. He did 30 minutes on the elliptical and noticed that his HR was elevated to the 130s instead of the usual 120s but he did not experience any symptoms. He finished his exercise and then ate breakfast and then began to experience 5/10 substernal chest pain radiating to his R temple and teeth (about 45 minutes later). He took 81mg ASA x 5 and lisinopril 10mg and then came to the ED, he had similar symptoms in August and went to get evaluated and was found to have high blood pressure that resolved after medications. He denied any other symptoms at the time including SOB, diaphoresis, nausea, vomiting or abdominal pain.     Of note, the patient received 7 stents at [facility] about 3.5 years ago. He was previously on plavix but tolerated it for less than 3 months. He also self discontinued his blood pressure medications earlier this year.     Upon arrival to the ED, the patient was afebrile, BP: 174/120, HR: 92, RR: 18, sating 99% on RA. EKG was done which showed ST elevations in leads I, aVL, V2-V6. STEMI alert was called and the patient was prepped for the cath lab. He received brillinta 180mg, 5000U heparin bolus, and then was started on a heparin drip.     He went to the cath lab and underwent PCI through the R radial and was found to have 99% occlusion of the proximal LAD and diag. He received POBA x 2. He was then transferred to the CCU for further management.

## 2019-01-01 NOTE — CONSULT NOTE ADULT - SUBJECTIVE AND OBJECTIVE BOX
Patient is a 67 y/o M with a PMH significant for CAD (s/p PCI x 7 at [facility] 3.5 years ago), DM2, and hypothyroidism who presented to the ED with 5/10 substernal CP that started after exercising on the elliptical this morning. Of note, the patient was away on vacation and had not exercised for 12 days. He did 30 minutes on the elliptical and noticed that his HR was elevated to the 130s instead of the usual 120s but he did not experience any symptoms. He finished his exercise and then ate breakfast and then began to experience 5/10 substernal chest pain radiating to his R temple and teeth (about 45 minutes later). He took 81mg ASA x 5 and lisinopril 10mg and then came to the ED, he had similar symptoms in August and went to get evaluated and was found to have high blood pressure that resolved after medications. He denied any other symptoms at the time including SOB, diaphoresis, nausea, vomiting or abdominal pain.     REVIEW OF SYSTEMS:  CONSTITUTIONAL: No weakness, fevers or chills  EYES/ENT: No visual changes;  No vertigo or throat pain   NECK: No pain or stiffness  RESPIRATORY: No cough, wheezing, hemoptysis; No shortness of breath  CARDIOVASCULAR: chest pain +  GASTROINTESTINAL: No abdominal or epigastric pain. No nausea, vomiting, or hematemesis; No diarrhea or constipation. No melena or hematochezia.  GENITOURINARY: No dysuria, frequency or hematuria  NEUROLOGICAL: No numbness or weakness  SKIN: No itching, burning, rashes, or lesions   All other review of systems is negative unless indicated above.    MEDICATIONS:  MEDICATIONS  (STANDING):  atorvastatin 80 milliGRAM(s) Oral at bedtime  famotidine    Tablet 20 milliGRAM(s) Oral daily  heparin  Infusion.  Unit(s)/Hr (10 mL/Hr) IV Continuous <Continuous>  levothyroxine 75 MICROGram(s) Oral daily  lisinopril 5 milliGRAM(s) Oral daily  metoprolol tartrate 12.5 milliGRAM(s) Oral two times a day  montelukast 10 milliGRAM(s) Oral daily  pantoprazole    Tablet 40 milliGRAM(s) Oral before breakfast      PHYSICAL EXAM:  T(C): 36.8 (01-01-19 @ 17:00), Max: 36.9 (01-01-19 @ 13:20)  HR: 90 (01-01-19 @ 17:00) (78 - 94)  BP: 133/86 (01-01-19 @ 17:00) (122/84 - 181/117)  RR: 21 (01-01-19 @ 17:00) (17 - 25)  SpO2: 96% (01-01-19 @ 17:00) (92% - 100%)  Wt(kg): --  I&O's Summary    01 Jan 2019 07:01  -  01 Jan 2019 18:08  --------------------------------------------------------  IN: 0 mL / OUT: 600 mL / NET: -600 mL      Height (cm): 170.18 (01-01 @ 13:09)  Weight (kg): 97.7 (01-01 @ 13:09)  BMI (kg/m2): 33.7 (01-01 @ 13:09)  BSA (m2): 2.09 (01-01 @ 13:09)    Appearance: Normal	  HEENT:   Normal oral mucosa, PERRL, EOMI	  Lymphatic: No lymphadenopathy , no edema  Cardiovascular: Normal S1 S2, No JVD, No murmurs , Peripheral pulses palpable 2+ bilaterally  Respiratory: Lungs clear to auscultation, normal effort 	  Gastrointestinal:  Soft, Non-tender, + BS	  Skin: No rashes, No ecchymoses, No cyanosis, warm to touch  Musculoskeletal: Normal range of motion, normal strength  Psychiatry:  Mood & affect appropriate  Ext: No edema                          16.5   11.4  )-----------( 198      ( 01 Jan 2019 15:44 )             48.5               01-01    138  |  107  |  20  ----------------------------<  132<H>  4.0   |  18<L>  |  0.78    Ca    9.0      01 Jan 2019 15:44  Phos  2.7     01-01  Mg     2.0     01-01    TPro  6.7  /  Alb  4.1  /  TBili  0.3  /  DBili  x   /  AST  492<H>  /  ALT  54<H>  /  AlkPhos  59  01-01    PT/INR - ( 01 Jan 2019 11:21 )   PT: 11.0 sec;   INR: 0.97 ratio         PTT - ( 01 Jan 2019 11:21 )  PTT:30.4 sec       CARDIAC MARKERS ( 01 Jan 2019 15:44 )  x     / x     / 4299 U/L / x     / 381.5 ng/mL              < from: CT Brain Stroke Protocol (01.01.19 @ 16:25) >  IMPRESSION:   No acute intracranial hemorrhage, mass effect, or shift of the midline   structures.       Cath Today:  PCI with POBA to the LAD and Diag for 99% stenosis

## 2019-01-01 NOTE — ED ADULT NURSE NOTE - OBJECTIVE STATEMENT
Pt is a 69 yo male co sudden onset of extreme CP 1 hour ago, pt states that he was working out on the elliptical and started feeling CP. He is reporting the pain in his CP, teeth/jaw and right temporal area. He denies any shortness of breath no vomiting, diarrhea. + nausea. Pt has a hx of 7 stents place 3.5 years ago at [facility]. He took 5 81mg ASA PTA. He denies any other complaints at this time.

## 2019-01-01 NOTE — CHART NOTE - NSCHARTNOTEFT_GEN_A_CORE
====================  CCU MIDNIGHT ROUNDS  ====================    CORINE DAVIES  [id number]    ====================  SUMMARY:  ====================    67 yo M CAD (PCI x 7 [facility] 3.5 yrs ago), DM2, hypothyroid a/w AWSTEMI s/p POBA x 2 LAD & Diag, thrombectomy     ====================  NEW EVENTS:  ====================    episode of dec responsiveness, memory loss post cath. CTH neg, codde stroke called - no intervention. now A, A, O, w/ out focal deficits. c/o CP worse when sitting up, better when laying flat, physical exam unremarkable. satting well. physical in    ====================  VITALS (Last 12 hrs):  ====================    T(C): 36.8 (01-01-19 @ 17:00), Max: 36.9 (01-01-19 @ 13:20)  HR: 80 (01-01-19 @ 20:10) (74 - 94)  BP: 142/90 (01-01-19 @ 20:10) (122/84 - 181/117)  BP(mean): 108 (01-01-19 @ 20:10) (97 - 111)  RR: 20 (01-01-19 @ 20:10) (17 - 25)  SpO2: 98% (01-01-19 @ 20:10) (92% - 100%)    TELEMETRY:         *BLOOD GAS/ARTERIAL/MIXED/VENOUS  *LACTATE    I&O's Summary    01 Jan 2019 07:01  -  01 Jan 2019 21:13  --------------------------------------------------------  IN: 0 mL / OUT: 600 mL / NET: -600 mL        ====================  PLAN:  ====================    Jennifer MOCTEZUMA-BC/CCU  spectra #[id number]  beeper #[id number] ====================  CCU MIDNIGHT ROUNDS  ====================    CORINE DAVIES  [id number]    ====================  SUMMARY:  ====================    69 yo M CAD (PCI x 7 [facility] 3.5 yrs ago), DM2, hypothyroid a/w AWSTEMI s/p POBA x 2 LAD & Diag, thrombectomy     ====================  NEW EVENTS:  ====================    episode of dec responsiveness, memory loss post cath. CTH neg, codde stroke called - no intervention. now A, A, O, w/ out focal deficits. c/o CP worse when sitting up, better when laying flat, physical exam unremarkable. satting well. diaphoresis improved     ====================  VITALS (Last 12 hrs):  ====================    T(C): 36.8 (01-01-19 @ 17:00), Max: 36.9 (01-01-19 @ 13:20)  HR: 80 (01-01-19 @ 20:10) (74 - 94)  BP: 142/90 (01-01-19 @ 20:10) (122/84 - 181/117)  BP(mean): 108 (01-01-19 @ 20:10) (97 - 111)  RR: 20 (01-01-19 @ 20:10) (17 - 25)  SpO2: 98% (01-01-19 @ 20:10) (92% - 100%)    TELEMETRY: sinus     I&O's Summary    01 Jan 2019 07:01  -  01 Jan 2019 21:13  --------------------------------------------------------  IN: 0 mL / OUT: 600 mL / NET: -600 mL      ====================  PLAN:  ====================  - AWMI - trend CE, DAPT, crestor 40 (hasn't tolerated lipitor in past s/t back pain), BB, ACE (monitor Cr), TTE in AM given CP, appears euvolemic on exam, pain better when laying flat ? pericarditis - consider high dose ASA or colchicine , hep gtt @ 48hrs @ 2300   - diaphoresis improved - infectious w/u   - neurochecks     Jennifer Eng AGACNP-BC/CCU  spectra #[id number]  beeper #[id number]

## 2019-01-01 NOTE — H&P ADULT - ASSESSMENT
This is a 69 y/o M with a PMH significant for CAD (s/p PCI x 7 at [facility] 3.5 years ago), DM2, and hypothyroidism who presented to the ED with CHRISTYI, s/p POBA x 2 to the LAD and Diag.

## 2019-01-01 NOTE — PATIENT PROFILE ADULT - NSSTREETDRUGUSE_GEN_A_NUR
Ms. Arielle Lechuga has a reminder for a \"due or due soon\" health maintenance. I have asked that she contact her primary care provider for follow-up on this health maintenance. never used

## 2019-01-01 NOTE — CONSULT NOTE ADULT - SUBJECTIVE AND OBJECTIVE BOX
Patient seen and evaluated at bedside in ED Critical with wife at bedside    Registration time: 10:51am  First ECG: 10:56am  Called by ED: 10:59am  Saw patient at bedside: 11:01am  Called cath Attendin:10am    Outpatient Cardiologist: Dr. Garcia Duncan    Chief Complaint: Chest pain x hours    HPI: 68M w/ PMHx of CAD (s/p PCI x 7 3.5yrs ago at [facility]), DM2, Hypothyroid who presented to the ED c/o 510 substernal CP that started after exercising on the elliptical this morning. The patient's symptoms started 10 minutes after he stopped exercising - he normally exercises however he had not been working out for the past 10 days as he was in [place]. The pain radiated to his teeth and R temple. He took  at home. He had a similar episode in August and the pain resolved when his BP was controlled. ED Course: Brilinta 180, Heparin 5000U followed by a gtt.    I spoke with the patient's Cardiologist - he report the patient drinks EtOH and is poorly compliant with medications    PMHx:   Hypothyroid  CAD (s/p PCI x 7)  DM2  HLD    PSHx: None     Allergies: No Known Allergies    Home Meds: Singulair (for a breathing problem), Jardiance 10, Crestor 40, Synthroid 75mcg, Zestril 5,     Current Medications:   heparin  Infusion.  Unit(s)/Hr IV Continuous <Continuous>  heparin  Injectable 4000 Unit(s) IV Push every 6 hours PRN    FAMILY HISTORY: Noncontributory    Social History: Works in Business  Smoking History: Denied  Alcohol Use: Denied  Drug Use: Denied    REVIEW OF SYSTEMS:  CONSTITUTIONAL: No weakness, fevers or chills  EYES/ENT: No visual changes;  No dysphagia  NECK: No pain or stiffness  RESPIRATORY: No cough, wheezing, hemoptysis; No shortness of breath  CARDIOVASCULAR: + chest pain, no palpitations; No lower extremity edema  GASTROINTESTINAL: No abdominal or epigastric pain. No nausea, vomiting, or hematemesis; No diarrhea or constipation. No melena or hematochezia.  BACK: No back pain  GENITOURINARY: No dysuria, frequency or hematuria  NEUROLOGICAL: No numbness or weakness  SKIN: No itching, burning, rashes, or lesions   All other review of systems is negative unless indicated above.    Physical Exam:  T(F): 98 (), Max: 98 ()  HR: 92 () (92 - 94) - HR 92 on my exam  BP: 166/113 () (166/113 - 181/117) - /120 on my exam  RR: 17 ()  SpO2: 99% on RA on my exam    GENERAL: The pt appeared to be in pain  HEAD:  Atraumatic, Normocephalic  ENT: EOMI, PERRLA, conjunctiva and sclera clear, Neck supple, No JVD, moist mucosa  CHEST/LUNG: Clear to auscultation bilaterally; No wheeze, equal breath sounds bilaterally   BACK: No spinal tenderness  HEART: Regular rate and rhythm; No murmurs, rubs, or gallops  ABDOMEN: Soft, Nontender, Nondistended; Bowel sounds present  EXTREMITIES:  No clubbing, cyanosis, or edema  PSYCH: Nl behavior, nl affect  NEUROLOGY: AAOx3, non-focal, cranial nerves intact  SKIN: Normal color, No rashes or lesions  LINES: PIV    Cardiovascular Diagnostic Testing:    ECG: Personally reviewed: sinus @ 90 w/ GERMANIA in I, avl, V2-V5    Imaging:    CXR: ordered, not performed    Labs: Personally reviewed                        16.4   7.6   )-----------( 211      ( 2019 11:21 )             49.9     Remainder of labs pending Patient seen and evaluated at bedside in ED Critical with wife at bedside    Registration time: 10:51am  First ECG: 10:56am  Called by ED: 10:59am  Saw patient at bedside: 11:01am  Called cath Attendin:10am  Intervention time: 12:10pm (door to intervention 79 mins)    Outpatient Cardiologist: Dr. Garcia Duncan    Chief Complaint: Chest pain x hours    HPI: 68M w/ PMHx of CAD (s/p PCI x 7 3.5yrs ago at [facility]), DM2, Hypothyroid who presented to the ED c/o 10 substernal CP that started after exercising on the elliptical this morning. The patient's symptoms started 10 minutes after he stopped exercising - he normally exercises however he had not been working out for the past 10 days as he was in [place]. The pain radiated to his teeth and R temple. He took  at home. He had a similar episode in August and the pain resolved when his BP was controlled. ED Course: Brilinta 180, Heparin 5000U followed by a gtt.    I spoke with the patient's Cardiologist - he report the patient drinks EtOH and is poorly compliant with medications    PMHx:   Hypothyroid  CAD (s/p PCI x 7)  DM2  HLD    PSHx: None     Allergies: No Known Allergies    Home Meds: Singulair (for a breathing problem), Jardiance 10, Crestor 40, Synthroid 75mcg, Zestril 5,     Current Medications:   heparin  Infusion.  Unit(s)/Hr IV Continuous <Continuous>  heparin  Injectable 4000 Unit(s) IV Push every 6 hours PRN    FAMILY HISTORY: Noncontributory    Social History: Works in Business  Smoking History: Denied  Alcohol Use: Denied  Drug Use: Denied    REVIEW OF SYSTEMS:  CONSTITUTIONAL: No weakness, fevers or chills  EYES/ENT: No visual changes;  No dysphagia  NECK: No pain or stiffness  RESPIRATORY: No cough, wheezing, hemoptysis; No shortness of breath  CARDIOVASCULAR: + chest pain, no palpitations; No lower extremity edema  GASTROINTESTINAL: No abdominal or epigastric pain. No nausea, vomiting, or hematemesis; No diarrhea or constipation. No melena or hematochezia.  BACK: No back pain  GENITOURINARY: No dysuria, frequency or hematuria  NEUROLOGICAL: No numbness or weakness  SKIN: No itching, burning, rashes, or lesions   All other review of systems is negative unless indicated above.    Physical Exam:  T(F): 98 (), Max: 98 ()  HR: 92 () (92 - 94) - HR 92 on my exam  BP: 166/113 () (166/113 - 181/117) - /120 on my exam  RR: 17 ()  SpO2: 99% on RA on my exam    GENERAL: The pt appeared to be in pain  HEAD:  Atraumatic, Normocephalic  ENT: EOMI, PERRLA, conjunctiva and sclera clear, Neck supple, No JVD, moist mucosa  CHEST/LUNG: Clear to auscultation bilaterally; No wheeze, equal breath sounds bilaterally   BACK: No spinal tenderness  HEART: Regular rate and rhythm; No murmurs, rubs, or gallops  ABDOMEN: Soft, Nontender, Nondistended; Bowel sounds present  EXTREMITIES:  No clubbing, cyanosis, or edema  PSYCH: Nl behavior, nl affect  NEUROLOGY: AAOx3, non-focal, cranial nerves intact  SKIN: Normal color, No rashes or lesions  LINES: PIV    Cardiovascular Diagnostic Testing:    ECG: Personally reviewed: sinus @ 90 w/ GERMANIA in I, avl, V2-V5    Imaging:    CXR: ordered, not performed    Labs: Personally reviewed                        16.4   7.6   )-----------( 211      ( 2019 11:21 )             49.9     Remainder of labs pending

## 2019-01-01 NOTE — H&P ADULT - ATTENDING COMMENTS
Patient presented with acute coronary syndrome secondary to in stent thrombosis of remote PCI to LAD, now status post thrombectomy and POBA.  Continue dual antiplatelet therapy with ASA 81 mg and ticagrelor 90 mg BID.  Heparin gtt for ACS for 48 hours.  Initiate beta-blocker.  Resume ACEi.    Follow-up TTE.

## 2019-01-01 NOTE — ED PROVIDER NOTE - OBJECTIVE STATEMENT
69 yo male hx of CAD sp stents presents with acute onset chest pain and SOB. Pain radiates to neck and b/l shoulders. Pain constant. Nothing better or worse.

## 2019-01-02 ENCOUNTER — TRANSCRIPTION ENCOUNTER (OUTPATIENT)
Age: 69
End: 2019-01-02

## 2019-01-02 DIAGNOSIS — R41.82 ALTERED MENTAL STATUS, UNSPECIFIED: ICD-10-CM

## 2019-01-02 DIAGNOSIS — I10 ESSENTIAL (PRIMARY) HYPERTENSION: ICD-10-CM

## 2019-01-02 DIAGNOSIS — E78.00 PURE HYPERCHOLESTEROLEMIA, UNSPECIFIED: ICD-10-CM

## 2019-01-02 DIAGNOSIS — Z95.5 PRESENCE OF CORONARY ANGIOPLASTY IMPLANT AND GRAFT: ICD-10-CM

## 2019-01-02 DIAGNOSIS — Z29.9 ENCOUNTER FOR PROPHYLACTIC MEASURES, UNSPECIFIED: ICD-10-CM

## 2019-01-02 LAB
ALBUMIN SERPL ELPH-MCNC: 4.2 G/DL — SIGNIFICANT CHANGE UP (ref 3.3–5)
ALP SERPL-CCNC: 53 U/L — SIGNIFICANT CHANGE UP (ref 40–120)
ALT FLD-CCNC: 58 U/L — HIGH (ref 10–45)
ANION GAP SERPL CALC-SCNC: 16 MMOL/L — SIGNIFICANT CHANGE UP (ref 5–17)
APTT BLD: 30 SEC — SIGNIFICANT CHANGE UP (ref 27.5–36.3)
APTT BLD: 50.8 SEC — HIGH (ref 27.5–36.3)
APTT BLD: 85.5 SEC — HIGH (ref 27.5–36.3)
AST SERPL-CCNC: 382 U/L — HIGH (ref 10–40)
BASOPHILS # BLD AUTO: 0 K/UL — SIGNIFICANT CHANGE UP (ref 0–0.2)
BASOPHILS NFR BLD AUTO: 0.4 % — SIGNIFICANT CHANGE UP (ref 0–2)
BILIRUB SERPL-MCNC: 0.5 MG/DL — SIGNIFICANT CHANGE UP (ref 0.2–1.2)
BUN SERPL-MCNC: 15 MG/DL — SIGNIFICANT CHANGE UP (ref 7–23)
CALCIUM SERPL-MCNC: 8.8 MG/DL — SIGNIFICANT CHANGE UP (ref 8.4–10.5)
CHLORIDE SERPL-SCNC: 103 MMOL/L — SIGNIFICANT CHANGE UP (ref 96–108)
CO2 SERPL-SCNC: 18 MMOL/L — LOW (ref 22–31)
CREAT SERPL-MCNC: 0.74 MG/DL — SIGNIFICANT CHANGE UP (ref 0.5–1.3)
EOSINOPHIL # BLD AUTO: 0.1 K/UL — SIGNIFICANT CHANGE UP (ref 0–0.5)
EOSINOPHIL NFR BLD AUTO: 0.8 % — SIGNIFICANT CHANGE UP (ref 0–6)
GLUCOSE SERPL-MCNC: 184 MG/DL — HIGH (ref 70–99)
HCT VFR BLD CALC: 46 % — SIGNIFICANT CHANGE UP (ref 39–50)
HGB BLD-MCNC: 15.6 G/DL — SIGNIFICANT CHANGE UP (ref 13–17)
LYMPHOCYTES # BLD AUTO: 0.9 K/UL — LOW (ref 1–3.3)
LYMPHOCYTES # BLD AUTO: 9.8 % — LOW (ref 13–44)
MAGNESIUM SERPL-MCNC: 2.2 MG/DL — SIGNIFICANT CHANGE UP (ref 1.6–2.6)
MCHC RBC-ENTMCNC: 31.3 PG — SIGNIFICANT CHANGE UP (ref 27–34)
MCHC RBC-ENTMCNC: 33.8 GM/DL — SIGNIFICANT CHANGE UP (ref 32–36)
MCV RBC AUTO: 92.4 FL — SIGNIFICANT CHANGE UP (ref 80–100)
MONOCYTES # BLD AUTO: 0.6 K/UL — SIGNIFICANT CHANGE UP (ref 0–0.9)
MONOCYTES NFR BLD AUTO: 6.2 % — SIGNIFICANT CHANGE UP (ref 2–14)
NEUTROPHILS # BLD AUTO: 7.8 K/UL — HIGH (ref 1.8–7.4)
NEUTROPHILS NFR BLD AUTO: 82.8 % — HIGH (ref 43–77)
PHOSPHATE SERPL-MCNC: 3 MG/DL — SIGNIFICANT CHANGE UP (ref 2.5–4.5)
PLATELET # BLD AUTO: 122 K/UL — LOW (ref 150–400)
POTASSIUM SERPL-MCNC: 4 MMOL/L — SIGNIFICANT CHANGE UP (ref 3.5–5.3)
POTASSIUM SERPL-SCNC: 4 MMOL/L — SIGNIFICANT CHANGE UP (ref 3.5–5.3)
PROT SERPL-MCNC: 6.9 G/DL — SIGNIFICANT CHANGE UP (ref 6–8.3)
RAPID RVP RESULT: SIGNIFICANT CHANGE UP
RBC # BLD: 4.98 M/UL — SIGNIFICANT CHANGE UP (ref 4.2–5.8)
RBC # FLD: 13.8 % — SIGNIFICANT CHANGE UP (ref 10.3–14.5)
SODIUM SERPL-SCNC: 137 MMOL/L — SIGNIFICANT CHANGE UP (ref 135–145)
WBC # BLD: 9.4 K/UL — SIGNIFICANT CHANGE UP (ref 3.8–10.5)
WBC # FLD AUTO: 9.4 K/UL — SIGNIFICANT CHANGE UP (ref 3.8–10.5)

## 2019-01-02 PROCEDURE — 92921: CPT | Mod: LD

## 2019-01-02 PROCEDURE — 80307 DRUG TEST PRSMV CHEM ANLYZR: CPT

## 2019-01-02 PROCEDURE — 99153 MOD SED SAME PHYS/QHP EA: CPT

## 2019-01-02 PROCEDURE — 82550 ASSAY OF CK (CPK): CPT

## 2019-01-02 PROCEDURE — 92978 ENDOLUMINL IVUS OCT C 1ST: CPT | Mod: LD

## 2019-01-02 PROCEDURE — 85027 COMPLETE CBC AUTOMATED: CPT

## 2019-01-02 PROCEDURE — 87040 BLOOD CULTURE FOR BACTERIA: CPT

## 2019-01-02 PROCEDURE — C1753: CPT

## 2019-01-02 PROCEDURE — 94640 AIRWAY INHALATION TREATMENT: CPT

## 2019-01-02 PROCEDURE — 80061 LIPID PANEL: CPT

## 2019-01-02 PROCEDURE — 93010 ELECTROCARDIOGRAM REPORT: CPT

## 2019-01-02 PROCEDURE — 70450 CT HEAD/BRAIN W/O DYE: CPT

## 2019-01-02 PROCEDURE — 80053 COMPREHEN METABOLIC PANEL: CPT

## 2019-01-02 PROCEDURE — C8929: CPT

## 2019-01-02 PROCEDURE — 93005 ELECTROCARDIOGRAM TRACING: CPT

## 2019-01-02 PROCEDURE — 85730 THROMBOPLASTIN TIME PARTIAL: CPT

## 2019-01-02 PROCEDURE — 71045 X-RAY EXAM CHEST 1 VIEW: CPT

## 2019-01-02 PROCEDURE — 86850 RBC ANTIBODY SCREEN: CPT

## 2019-01-02 PROCEDURE — C1894: CPT

## 2019-01-02 PROCEDURE — 87798 DETECT AGENT NOS DNA AMP: CPT

## 2019-01-02 PROCEDURE — 70496 CT ANGIOGRAPHY HEAD: CPT

## 2019-01-02 PROCEDURE — 87486 CHLMYD PNEUM DNA AMP PROBE: CPT

## 2019-01-02 PROCEDURE — 93458 L HRT ARTERY/VENTRICLE ANGIO: CPT | Mod: 59

## 2019-01-02 PROCEDURE — 84443 ASSAY THYROID STIM HORMONE: CPT

## 2019-01-02 PROCEDURE — 96375 TX/PRO/DX INJ NEW DRUG ADDON: CPT

## 2019-01-02 PROCEDURE — 70498 CT ANGIOGRAPHY NECK: CPT

## 2019-01-02 PROCEDURE — 87633 RESP VIRUS 12-25 TARGETS: CPT

## 2019-01-02 PROCEDURE — 83036 HEMOGLOBIN GLYCOSYLATED A1C: CPT

## 2019-01-02 PROCEDURE — 81001 URINALYSIS AUTO W/SCOPE: CPT

## 2019-01-02 PROCEDURE — 82962 GLUCOSE BLOOD TEST: CPT

## 2019-01-02 PROCEDURE — 86901 BLOOD TYPING SEROLOGIC RH(D): CPT

## 2019-01-02 PROCEDURE — 99152 MOD SED SAME PHYS/QHP 5/>YRS: CPT

## 2019-01-02 PROCEDURE — 82553 CREATINE MB FRACTION: CPT

## 2019-01-02 PROCEDURE — 93306 TTE W/DOPPLER COMPLETE: CPT | Mod: 26

## 2019-01-02 PROCEDURE — 99291 CRITICAL CARE FIRST HOUR: CPT

## 2019-01-02 PROCEDURE — C1757: CPT

## 2019-01-02 PROCEDURE — 92941 PRQ TRLML REVSC TOT OCCL AMI: CPT | Mod: LD

## 2019-01-02 PROCEDURE — 87581 M.PNEUMON DNA AMP PROBE: CPT

## 2019-01-02 PROCEDURE — C1725: CPT

## 2019-01-02 PROCEDURE — 84100 ASSAY OF PHOSPHORUS: CPT

## 2019-01-02 PROCEDURE — 86900 BLOOD TYPING SEROLOGIC ABO: CPT

## 2019-01-02 PROCEDURE — 83735 ASSAY OF MAGNESIUM: CPT

## 2019-01-02 PROCEDURE — C1887: CPT

## 2019-01-02 PROCEDURE — C1769: CPT

## 2019-01-02 PROCEDURE — 84484 ASSAY OF TROPONIN QUANT: CPT

## 2019-01-02 PROCEDURE — 96374 THER/PROPH/DIAG INJ IV PUSH: CPT

## 2019-01-02 PROCEDURE — 99285 EMERGENCY DEPT VISIT HI MDM: CPT | Mod: 25

## 2019-01-02 PROCEDURE — 85610 PROTHROMBIN TIME: CPT

## 2019-01-02 RX ORDER — METOPROLOL TARTRATE 50 MG
25 TABLET ORAL
Qty: 0 | Refills: 0 | Status: DISCONTINUED | OUTPATIENT
Start: 2019-01-02 | End: 2019-01-03

## 2019-01-02 RX ORDER — ASPIRIN/CALCIUM CARB/MAGNESIUM 324 MG
81 TABLET ORAL DAILY
Qty: 0 | Refills: 0 | Status: DISCONTINUED | OUTPATIENT
Start: 2019-01-02 | End: 2019-01-03

## 2019-01-02 RX ADMIN — LISINOPRIL 5 MILLIGRAM(S): 2.5 TABLET ORAL at 11:03

## 2019-01-02 RX ADMIN — Medication 81 MILLIGRAM(S): at 11:01

## 2019-01-02 RX ADMIN — HEPARIN SODIUM 200 UNIT(S)/HR: 5000 INJECTION INTRAVENOUS; SUBCUTANEOUS at 21:05

## 2019-01-02 RX ADMIN — Medication 650 MILLIGRAM(S): at 10:10

## 2019-01-02 RX ADMIN — Medication 25 MILLIGRAM(S): at 18:02

## 2019-01-02 RX ADMIN — Medication 75 MICROGRAM(S): at 05:15

## 2019-01-02 RX ADMIN — Medication 650 MILLIGRAM(S): at 09:38

## 2019-01-02 RX ADMIN — FAMOTIDINE 20 MILLIGRAM(S): 10 INJECTION INTRAVENOUS at 11:02

## 2019-01-02 RX ADMIN — ALBUTEROL 2 PUFF(S): 90 AEROSOL, METERED ORAL at 05:34

## 2019-01-02 RX ADMIN — Medication 25 MILLIGRAM(S): at 05:15

## 2019-01-02 RX ADMIN — HEPARIN SODIUM 1300 UNIT(S)/HR: 5000 INJECTION INTRAVENOUS; SUBCUTANEOUS at 06:22

## 2019-01-02 RX ADMIN — TICAGRELOR 90 MILLIGRAM(S): 90 TABLET ORAL at 05:15

## 2019-01-02 RX ADMIN — MONTELUKAST 10 MILLIGRAM(S): 4 TABLET, CHEWABLE ORAL at 11:01

## 2019-01-02 RX ADMIN — PANTOPRAZOLE SODIUM 40 MILLIGRAM(S): 20 TABLET, DELAYED RELEASE ORAL at 05:15

## 2019-01-02 RX ADMIN — Medication 30 MILLILITER(S): at 05:39

## 2019-01-02 RX ADMIN — CHLORHEXIDINE GLUCONATE 1 APPLICATION(S): 213 SOLUTION TOPICAL at 05:15

## 2019-01-02 RX ADMIN — TICAGRELOR 90 MILLIGRAM(S): 90 TABLET ORAL at 18:02

## 2019-01-02 RX ADMIN — HEPARIN SODIUM 0 UNIT(S)/HR: 5000 INJECTION INTRAVENOUS; SUBCUTANEOUS at 13:26

## 2019-01-02 RX ADMIN — ROSUVASTATIN CALCIUM 40 MILLIGRAM(S): 5 TABLET ORAL at 21:05

## 2019-01-02 RX ADMIN — HEPARIN SODIUM 4000 UNIT(S): 5000 INJECTION INTRAVENOUS; SUBCUTANEOUS at 06:31

## 2019-01-02 NOTE — PROGRESS NOTE ADULT - ATTENDING COMMENTS
Patient with anterior wall STEMI status post thrombectomy and POBA.  Continue heparin gtt for ACS.  Continue dual antiplatelet therapy, high intensity statin, beta-blocker and ACEi.    Uptitrate beta-blocker and ACEi as tolerated.    Plan for TTE with [facility] to rule out LV thrombus in patient with anterior wall MI and severely reduced LVEF.

## 2019-01-02 NOTE — PROGRESS NOTE ADULT - ASSESSMENT
This is a 69 y/o M with a PMH significant for CAD (s/p PCI x 7 at [facility] 3.5 years ago), DM2, and hypothyroidism who presented to the ED with CHRISTYI, s/p POBA x 2 to the LAD and Diag.

## 2019-01-02 NOTE — PROGRESS NOTE ADULT - SUBJECTIVE AND OBJECTIVE BOX
HPI:  This is a 67 y/o M with a PMH significant for CAD (s/p PCI x 7 at [facility] 3.5 years ago), DM2, and hypothyroidism who presented to the ED with 5/10 substernal CP that started after exercising on the elliptical this morning. Of note, the patient was away on vacation and had not exercised for 12 days. He did 30 minutes on the elliptical and noticed that his HR was elevated to the 130s instead of the usual 120s but he did not experience any symptoms. He finished his exercise and then ate breakfast and then began to experience 5/10 substernal chest pain radiating to his R temple and teeth (about 45 minutes later). He took 81mg ASA x 5 and lisinopril 10mg and then came to the ED, he had similar symptoms in August and went to get evaluated and was found to have high blood pressure that resolved after medications. He denied any other symptoms at the time including SOB, diaphoresis, nausea, vomiting or abdominal pain.     Of note, the patient received 7 stents at [facility] about 3.5 years ago. He was previously on plavix but tolerated it for less than 3 months. He also self discontinued his blood pressure medications earlier this year.     Upon arrival to the ED, the patient was afebrile, BP: 174/120, HR: 92, RR: 18, sating 99% on RA. EKG was done which showed ST elevations in leads I, aVL, V2-V6. STEMI alert was called and the patient was prepped for the cath lab. He received brillinta 180mg, 5000U heparin bolus, and then was started on a heparin drip.     He went to the cath lab and underwent PCI through the R radial and was found to have 99% occlusion of the proximal LAD and diag. He received POBA x 2. He was then transferred to the CCU for further management. (2019 13:22)      SUBJECTIVE:  Patient is a 68y old  Male who presents with a chief complaint of STEMI (2019 18:05)          OBJECTIVE:  Review Of Systems:  Constitutional: [ ] Fever [ ] Chills [ ] Fatigue [ ] Weight change   HEENT: [ ] Blurred vision [ ] Eye Pain [ ] Headache [ ] Runny nose [ ] Sore Throat   Respiratory: [ ] Cough [ ] Wheezing [ ] Shortness of breath  Cardiovascular: [ ] Chest Pain [ ] Palpitations [ ] LOMBARDO [ ] PND [ ] Orthopnea  Gastrointestinal: [ ] Abdominal Pain [ ] Diarrhea [ ] Constipation [ ] Hemorrhoids [ ] Nausea [ ] Vomiting  Genitourinary: [ ] Nocturia [ ] Dysuria [ ] Incontinence  Extremities: [ ] Swelling [ ] Joint Pain  Neurologic: [ ] Focal deficit [ ] Paresthesias [ ] Syncope  Lymphatic: [ ] Swelling [ ] Lymphadenopathy   Skin: [ ] Rash [ ] Ecchymoses [ ] Wounds [ ] Lesions  Psychiatry: [ ] Depression [ ] Suicidal/Homicidal Ideation [ ] Anxiety [ ] Sleep Disturbances  [ ] 10 point review of systems is otherwise negative except as mentioned above            [ ]Unable to obtain    Allergy:  Allergies    No Known Allergies    Intolerances        Medications:  MEDICATIONS  (STANDING):  chlorhexidine 4% Liquid 1 Application(s) Topical <User Schedule>  famotidine    Tablet 20 milliGRAM(s) Oral daily  heparin  Infusion.  Unit(s)/Hr (10 mL/Hr) IV Continuous <Continuous>  levothyroxine 75 MICROGram(s) Oral daily  lisinopril 5 milliGRAM(s) Oral daily  metoprolol tartrate 25 milliGRAM(s) Oral two times a day  montelukast 10 milliGRAM(s) Oral daily  pantoprazole    Tablet 40 milliGRAM(s) Oral before breakfast  rosuvastatin 40 milliGRAM(s) Oral at bedtime  ticagrelor 90 milliGRAM(s) Oral two times a day    MEDICATIONS  (PRN):  acetaminophen   Tablet .. 650 milliGRAM(s) Oral every 6 hours PRN Temp greater or equal to 38C (100.4F), Mild Pain (1 - 3), Moderate Pain (4 - 6)  ALBUTerol    90 MICROgram(s) HFA Inhaler 2 Puff(s) Inhalation every 6 hours PRN Shortness of Breath and/or Wheezing  benzonatate 100 milliGRAM(s) Oral three times a day PRN Cough  heparin  Injectable 4000 Unit(s) IV Push every 6 hours PRN For aPTT less than 40      PMH/PSH/FH/SH: [ ] Unchanged    Vitals:  T(C): 37.1 (19 @ 05:00), Max: 37.1 (19 @ 05:00)  HR: 74 (19 @ 06:00) (72 - 94)  BP: 106/73 (19 @ 06:00) (104/64 - 181/117)  BP(mean): 83 (19 @ 06:00) (77 - 118)  RR: 18 (19 @ 06:00) (17 - 25)  SpO2: 96% (19 @ 03:00) (92% - 100%)  Wt(kg): --  Daily Height in cm: 170.18 (2019 13:09)    Daily   I&O's Summary    2019 07:01  -  2019 07:00  --------------------------------------------------------  IN: 423 mL / OUT: 1501 mL / NET: -1078 mL        Labs:                        15.6   9.4   )-----------( 122      ( 2019 05:35 )             46.0         137  |  103  |  15  ----------------------------<  184<H>  4.0   |  18<L>  |  0.74    Ca    8.8      2019 05:35  Phos  3.0       Mg     2.2         TPro  6.9  /  Alb  4.2  /  TBili  0.5  /  DBili  x   /  AST  382<H>  /  ALT  58<H>  /  AlkPhos  53      PT/INR - ( 2019 11:21 )   PT: 11.0 sec;   INR: 0.97 ratio         PTT - ( 2019 05:35 )  PTT:30.0 sec  CARDIAC MARKERS ( 2019 05:35 )  x     / x     / 2506 U/L / x     / 192.8 ng/mL  CARDIAC MARKERS ( 2019 21:59 )  x     / x     / 3666 U/L / x     / 312.4 ng/mL  CARDIAC MARKERS ( 2019 15:44 )  x     / x     / 4299 U/L / x     / 381.5 ng/mL      Magnesium, Serum: 2.2 mg/dL ( @ 05:35)  Phosphorus Level, Serum: 3.0 mg/dL ( @ 05:35)  Magnesium, Serum: 2.0 mg/dL ( @ 21:59)  Phosphorus Level, Serum: 2.9 mg/dL ( @ 21:59)  Magnesium, Serum: 2.0 mg/dL ( @ 15:44)  Phosphorus Level, Serum: 2.7 mg/dL ( @ 15:44)    Total Cholesterol: 168  LDL: 93  HDL: 43  T            ECG:    Echo:    Stress Testing:     Cath:    Imaging:    Interpretation of Telemetry:      Physical Exam:  Appearance: [ ] Normal  [ ] abnormal [ ] NAD   Eyes: [ ] PERRL [ ] EOMI  HENT: [ ] Normal [ ] Abnormal oral muscosa [ ]NC/AT  Cardiovascular: [ ] S1 [ ] S2 [ ] RRR [ ] m/r/g [ ]edema [ ] JVP  Procedural Access Site: [ ]  hematoma [ ] tender to palpation [ ] 2+ pulse [ ] bruit [ ] Ecchymosis  Respiratory: [ ] Clear to auscultation bilaterally  Gastrointestinal: [ ] Soft [ ] tenderness[ ] distension [ ] BS  Musculoskeletal: [ ] clubbing [ ] joint deformity   Neurologic: [ ] Non-focal  Lymphatic: [ ] lymphadenopathy  Psychiatry: [ ] AAOx3  [ ] confused [ ] disoriented [ ] Mood & affect appropriate  Skin: [ ]  rashes [ ] ecchymoses [ ] cyanosis HPI:  This is a 69 y/o M with a PMH significant for CAD (s/p PCI x 7 at [facility] 3.5 years ago), DM2, and hypothyroidism who presented to the ED with 5/10 substernal CP that started after exercising on the elliptical this morning. Of note, the patient was away on vacation and had not exercised for 12 days. He did 30 minutes on the elliptical and noticed that his HR was elevated to the 130s instead of the usual 120s but he did not experience any symptoms. He finished his exercise and then ate breakfast and then began to experience 5/10 substernal chest pain radiating to his R temple and teeth (about 45 minutes later). He took 81mg ASA x 5 and lisinopril 10mg and then came to the ED, he had similar symptoms in August and went to get evaluated and was found to have high blood pressure that resolved after medications. He denied any other symptoms at the time including SOB, diaphoresis, nausea, vomiting or abdominal pain.     Of note, the patient received 7 stents at [facility] about 3.5 years ago. He was previously on plavix but tolerated it for less than 3 months. He also self discontinued his blood pressure medications earlier this year.     Upon arrival to the ED, the patient was afebrile, BP: 174/120, HR: 92, RR: 18, sating 99% on RA. EKG was done which showed ST elevations in leads I, aVL, V2-V6. STEMI alert was called and the patient was prepped for the cath lab. He received brillinta 180mg, 5000U heparin bolus, and then was started on a heparin drip.     He went to the cath lab and underwent PCI through the R radial and was found to have 99% occlusion of the proximal LAD and diag. He received POBA x 2. He was then transferred to the CCU for further management. (2019 13:22)      SUBJECTIVE:  Patient is a 68y old  Male who presents with a chief complaint of STEMI (2019 18:05)    Overnight, patient complained of increased diaphoresis. This morning was able to walk around but then felt SOB and had a headache. These symptoms improved when laying down.      OBJECTIVE:  Review Of Systems:  Constitutional: [ ] Fever [ ] Chills [ ] Fatigue [ ] Weight change   HEENT: [ ] Blurred vision [ ] Eye Pain [ ] Headache [ ] Runny nose [ ] Sore Throat   Respiratory: [ X] Cough [ ] Wheezing [ X] Shortness of breath  Cardiovascular: [ ] Chest Pain [ ] Palpitations [ ] LOMBARDO [ ] PND [ ] Orthopnea  Gastrointestinal: [ ] Abdominal Pain [ ] Diarrhea [ ] Constipation [ ] Hemorrhoids [ ] Nausea [ ] Vomiting  Genitourinary: [ ] Nocturia [ ] Dysuria [ ] Incontinence  Extremities: [ ] Swelling [ ] Joint Pain  Neurologic: [ ] Focal deficit [ ] Paresthesias [ ] Syncope  Lymphatic: [ ] Swelling [ ] Lymphadenopathy   Skin: [ ] Rash [ ] Ecchymoses [ ] Wounds [ ] Lesions  Psychiatry: [ ] Depression [ ] Suicidal/Homicidal Ideation [ ] Anxiety [ ] Sleep Disturbances  [ ] 10 point review of systems is otherwise negative except as mentioned above            [ ]Unable to obtain    Allergy:  Allergies    No Known Allergies    Intolerances        Medications:  MEDICATIONS  (STANDING):  chlorhexidine 4% Liquid 1 Application(s) Topical <User Schedule>  famotidine    Tablet 20 milliGRAM(s) Oral daily  heparin  Infusion.  Unit(s)/Hr (10 mL/Hr) IV Continuous <Continuous>  levothyroxine 75 MICROGram(s) Oral daily  lisinopril 5 milliGRAM(s) Oral daily  metoprolol tartrate 25 milliGRAM(s) Oral two times a day  montelukast 10 milliGRAM(s) Oral daily  pantoprazole    Tablet 40 milliGRAM(s) Oral before breakfast  rosuvastatin 40 milliGRAM(s) Oral at bedtime  ticagrelor 90 milliGRAM(s) Oral two times a day    MEDICATIONS  (PRN):  acetaminophen   Tablet .. 650 milliGRAM(s) Oral every 6 hours PRN Temp greater or equal to 38C (100.4F), Mild Pain (1 - 3), Moderate Pain (4 - 6)  ALBUTerol    90 MICROgram(s) HFA Inhaler 2 Puff(s) Inhalation every 6 hours PRN Shortness of Breath and/or Wheezing  benzonatate 100 milliGRAM(s) Oral three times a day PRN Cough  heparin  Injectable 4000 Unit(s) IV Push every 6 hours PRN For aPTT less than 40      PMH/PSH/FH/SH: [ ] Unchanged    Vitals:  T(C): 37.1 (19 @ 05:00), Max: 37.1 (19 @ 05:00)  HR: 74 (19 @ 06:00) (72 - 94)  BP: 106/73 (19 @ 06:00) (104/64 - 181/117)  BP(mean): 83 (19 @ 06:00) (77 - 118)  RR: 18 (19 @ 06:00) (17 - 25)  SpO2: 96% (19 @ 03:00) (92% - 100%)  Wt(kg): --  Daily Height in cm: 170.18 (2019 13:09)    Daily   I&O's Summary    2019 07:01  -  2019 07:00  --------------------------------------------------------  IN: 423 mL / OUT: 1501 mL / NET: -1078 mL        Labs:                        15.6   9.4   )-----------( 122      ( 2019 05:35 )             46.0     01-02    137  |  103  |  15  ----------------------------<  184<H>  4.0   |  18<L>  |  0.74    Ca    8.8      2019 05:35  Phos  3.0       Mg     2.2         TPro  6.9  /  Alb  4.2  /  TBili  0.5  /  DBili  x   /  AST  382<H>  /  ALT  58<H>  /  AlkPhos  53  02    PT/INR - ( 2019 11:21 )   PT: 11.0 sec;   INR: 0.97 ratio         PTT - ( 2019 05:35 )  PTT:30.0 sec  CARDIAC MARKERS ( 2019 05:35 )  x     / x     / 2506 U/L / x     / 192.8 ng/mL  CARDIAC MARKERS ( 2019 21:59 )  x     / x     / 3666 U/L / x     / 312.4 ng/mL  CARDIAC MARKERS ( 2019 15:44 )  x     / x     / 4299 U/L / x     / 381.5 ng/mL      Magnesium, Serum: 2.2 mg/dL ( @ 05:35)  Phosphorus Level, Serum: 3.0 mg/dL ( @ 05:35)  Magnesium, Serum: 2.0 mg/dL ( @ 21:59)  Phosphorus Level, Serum: 2.9 mg/dL ( @ 21:59)  Magnesium, Serum: 2.0 mg/dL ( @ 15:44)  Phosphorus Level, Serum: 2.7 mg/dL ( @ 15:44)    Total Cholesterol: 168  LDL: 93  HDL: 43  T            ECG:    Echo:    Stress Testing:     Cath:    Imaging:    Interpretation of Telemetry:      Physical Exam:  Appearance: [ ] Normal  [ ] abnormal [ ] NAD   Eyes: [ ] PERRL [ ] EOMI  HENT: [ ] Normal [ ] Abnormal oral mucosa [ ]NC/AT  Cardiovascular: [ ] S1 [ ] S2 [ ] RRR [ ] m/r/g [ ]edema [ ] JVP  Procedural Access Site: [ ]  hematoma [ ] tender to palpation [ ] 2+ pulse [ ] bruit [ ] Ecchymosis  Respiratory: [ ] Clear to auscultation bilaterally  Gastrointestinal: [ ] Soft [ ] tenderness[ ] distension [ ] BS  Musculoskeletal: [ ] clubbing [ ] joint deformity   Neurologic: [ ] Non-focal  Lymphatic: [ ] lymphadenopathy  Psychiatry: [ ] AAOx3  [ ] confused [ ] disoriented [ ] Mood & affect appropriate  Skin: [ ]  rashes [ ] ecchymoses [ ] cyanosis HPI:  This is a 69 y/o M with a PMH significant for CAD (s/p PCI x 7 at [facility] 3.5 years ago), DM2, and hypothyroidism who presented to the ED with 5/10 substernal CP that started after exercising on the elliptical this morning. Of note, the patient was away on vacation and had not exercised for 12 days. He did 30 minutes on the elliptical and noticed that his HR was elevated to the 130s instead of the usual 120s but he did not experience any symptoms. He finished his exercise and then ate breakfast and then began to experience 5/10 substernal chest pain radiating to his R temple and teeth (about 45 minutes later). He took 81mg ASA x 5 and lisinopril 10mg and then came to the ED, he had similar symptoms in August and went to get evaluated and was found to have high blood pressure that resolved after medications. He denied any other symptoms at the time including SOB, diaphoresis, nausea, vomiting or abdominal pain.     Of note, the patient received 7 stents at [facility] about 3.5 years ago. He was previously on plavix but tolerated it for less than 3 months. He also self discontinued his blood pressure medications earlier this year.     Upon arrival to the ED, the patient was afebrile, BP: 174/120, HR: 92, RR: 18, sating 99% on RA. EKG was done which showed ST elevations in leads I, aVL, V2-V6. STEMI alert was called and the patient was prepped for the cath lab. He received brillinta 180mg, 5000U heparin bolus, and then was started on a heparin drip.     He went to the cath lab and underwent PCI through the R radial and was found to have 99% occlusion of the proximal LAD and diag. He received POBA x 2. He was then transferred to the CCU for further management. (2019 13:22)      SUBJECTIVE:  Patient is a 68y old  Male who presents with a chief complaint of STEMI (2019 18:05)    Overnight, patient complained of increased diaphoresis. This morning was able to walk around but then felt SOB and had a headache. These symptoms improved when laying down.      OBJECTIVE:  Review Of Systems:  Constitutional: [ ] Fever [ ] Chills [ ] Fatigue [ ] Weight change   HEENT: [ ] Blurred vision [ ] Eye Pain [ ] Headache [ ] Runny nose [ ] Sore Throat   Respiratory: [ X] Cough [ ] Wheezing [ X] Shortness of breath  Cardiovascular: [ ] Chest Pain [ ] Palpitations [ ] LOMBARDO [ ] PND [ ] Orthopnea  Gastrointestinal: [ ] Abdominal Pain [ ] Diarrhea [ ] Constipation [ ] Hemorrhoids [ ] Nausea [ ] Vomiting  Genitourinary: [ ] Nocturia [ ] Dysuria [ ] Incontinence  Extremities: [ ] Swelling [ ] Joint Pain  Neurologic: [ ] Focal deficit [ ] Paresthesias [ ] Syncope  Lymphatic: [ ] Swelling [ ] Lymphadenopathy   Skin: [ ] Rash [ ] Ecchymoses [ ] Wounds [ ] Lesions  Psychiatry: [ ] Depression [ ] Suicidal/Homicidal Ideation [ ] Anxiety [ ] Sleep Disturbances  [ ] 10 point review of systems is otherwise negative except as mentioned above            [ ]Unable to obtain    Allergy:  Allergies    No Known Allergies    Intolerances        Medications:  MEDICATIONS  (STANDING):  chlorhexidine 4% Liquid 1 Application(s) Topical <User Schedule>  famotidine    Tablet 20 milliGRAM(s) Oral daily  heparin  Infusion.  Unit(s)/Hr (10 mL/Hr) IV Continuous <Continuous>  levothyroxine 75 MICROGram(s) Oral daily  lisinopril 5 milliGRAM(s) Oral daily  metoprolol tartrate 25 milliGRAM(s) Oral two times a day  montelukast 10 milliGRAM(s) Oral daily  pantoprazole    Tablet 40 milliGRAM(s) Oral before breakfast  rosuvastatin 40 milliGRAM(s) Oral at bedtime  ticagrelor 90 milliGRAM(s) Oral two times a day    MEDICATIONS  (PRN):  acetaminophen   Tablet .. 650 milliGRAM(s) Oral every 6 hours PRN Temp greater or equal to 38C (100.4F), Mild Pain (1 - 3), Moderate Pain (4 - 6)  ALBUTerol    90 MICROgram(s) HFA Inhaler 2 Puff(s) Inhalation every 6 hours PRN Shortness of Breath and/or Wheezing  benzonatate 100 milliGRAM(s) Oral three times a day PRN Cough  heparin  Injectable 4000 Unit(s) IV Push every 6 hours PRN For aPTT less than 40      PMH/PSH/FH/SH: [ ] Unchanged    Vitals:  T(C): 37.1 (19 @ 05:00), Max: 37.1 (19 @ 05:00)  HR: 74 (19 @ 06:00) (72 - 94)  BP: 106/73 (19 @ 06:00) (104/64 - 181/117)  BP(mean): 83 (19 @ 06:00) (77 - 118)  RR: 18 (19 @ 06:00) (17 - 25)  SpO2: 96% (19 @ 03:00) (92% - 100%)  Wt(kg): --  Daily Height in cm: 170.18 (2019 13:09)    Daily   I&O's Summary    2019 07:01  -  2019 07:00  --------------------------------------------------------  IN: 423 mL / OUT: 1501 mL / NET: -1078 mL        Labs:                        15.6   9.4   )-----------( 122      ( 2019 05:35 )             46.0     01-02    137  |  103  |  15  ----------------------------<  184<H>  4.0   |  18<L>  |  0.74    Ca    8.8      2019 05:35  Phos  3.0       Mg     2.2         TPro  6.9  /  Alb  4.2  /  TBili  0.5  /  DBili  x   /  AST  382<H>  /  ALT  58<H>  /  AlkPhos  53  02    PT/INR - ( 2019 11:21 )   PT: 11.0 sec;   INR: 0.97 ratio         PTT - ( 2019 05:35 )  PTT:30.0 sec  CARDIAC MARKERS ( 2019 05:35 )  x     / x     / 2506 U/L / x     / 192.8 ng/mL  CARDIAC MARKERS ( 2019 21:59 )  x     / x     / 3666 U/L / x     / 312.4 ng/mL  CARDIAC MARKERS ( 2019 15:44 )  x     / x     / 4299 U/L / x     / 381.5 ng/mL      Magnesium, Serum: 2.2 mg/dL ( @ 05:35)  Phosphorus Level, Serum: 3.0 mg/dL ( 05:35)  Magnesium, Serum: 2.0 mg/dL ( @ 21:59)  Phosphorus Level, Serum: 2.9 mg/dL ( 21:59)  Magnesium, Serum: 2.0 mg/dL ( 15:44)  Phosphorus Level, Serum: 2.7 mg/dL ( @ 15:44)    Total Cholesterol: 168  LDL: 93  HDL: 43  T            ECG:    Echo: < from: TTE with Doppler (w/Cont) (19 @ 07:47) >  EF (Visual Estimate): 30 %  ------------------------------------------------------------------------  Observations:  Mitral Valve: Normal mitral valve. Mild mitral  regurgitation.  Aortic Valve/Aorta: Normal trileaflet aortic valve. Minimal  aortic regurgitation.  Aortic Root: 3.4 cm.  Left Atrium: Normal left atrium.  LA volume index = 29  cc/m2.  Left Ventricle: Severe segmental left ventricular systolic  dysfunction.  Severe hypokinesis of the septum, apex,  mid-distal anterior, distal inferior, lateral, distal  inferolateral walls. Endocardial visualization enhanced  with intravenous injection of Ultrasonic Enhancing Agent  (Definity). No obvious LV thrombus.  Normal left  ventricular internal dimensions and wall thicknesses.  Right Heart: Normal right atrium. Normal right ventricular  size and function. Normal tricuspid valve. Minimal  tricuspid regurgitation. Normal pulmonic valve.  Pericardium/Pleura: Normal pericardium with no pericardial  effusion.  Hemodynamic: Estimated right atrial pressure is 8 mm Hg.  Inadequate tricuspid regurgitation Doppler envelope  precludes estimation of RVSP.  ------------------------------------------------------------------------  Conclusions:  1. Normal mitral valve. Mild mitral regurgitation.  2. Normal trileaflet aortic valve. Minimal aortic  regurgitation.  3. Severe segmental leftventricular systolic dysfunction.  Severe hypokinesis of the septum, apex, mid-distal  anterior, distal inferior, lateral, distal inferolateral  walls. Endocardial visualization enhanced with intravenous  injection of Ultrasonic Enhancing Agent (Definity). No  obvious LV thrombus.  4. Normal right ventricular size and function.  *** No previous Echo exam.  ------------------------------------------------------------------------  Confirmed on  2019 - 13:40:45 by Zaire Archibald MD  ------------------------------------------------------------------------    < end of copied text >    Cath: < from: Cardiac Cath Lab - Adult (19 @ 11:41) >  VENTRICLES: EF estimated was 45 %.  CORONARY VESSELS: The coronary circulation is right dominant.  LM:   --  LM: Normal.  LAD:   --  Proximal LAD: There was a tubular 95 % stenosis at the site of a  prior angioplasty with stent placement. The lesion was associated with a  large filling defect consistent with thrombus. There was BALDEMAR grade 1 flow  through the vessel (slow flow without perfusion). This is a likely culprit  for the patient's clinical presentation. An intervention was performed.  --  D1: There was a tubular 95 % stenosis at the site of a prior  angioplasty with stent placement. There was BALDEMAR grade 1 flow through the  vessel (slow flow without perfusion). This is a likely culprit for the  patient's clinical presentation. An intervention was performed.  CX:   --  Circumflex: Angiography showed moderate atherosclerosis.  RCA:   --  RCA: Angiography showed minor luminal irregularities with no  flow limiting lesions.  COMPLICATIONS: There were no complications.  DIAGNOSTIC RECOMMENDATIONS:  1. Successful POBA to the pLAD/D1 with a 2.0mm followed by 4.0mm NC balloon  tothe existing stents.  2. IVUS performed revealing only a few unapposed struts, thus only  post-dilitation performed.  3. DAPT.  INTERVENTIONAL RECOMMENDATIONS:  1. Successful POBA to the pLAD/D1 with a 2.0mm followed by 4.0mm NC balloon  to the existing stents.  2. IVUS performed revealing only a few unapposed struts, thus only  post-dilitation performed.  3. DAPT.  Prepared and signed by  Ambika Galicia M.D.  Signed 2019 09:38:53    < end of copied text >    Imaging: < from: CT Angio Head w/ IV Cont (19 @ 16:25) >  FINDINGS:     CTA NECK: There is normal flow-related enhancement of bilateral common   and internal carotid arteries and bilateral vertebral arteries. No   evidence of aneurysm or dissection. The left carotid bifurcation   demonstrates moderate atherosclerotic calcification.     CTA BRAIN: There is normal flow-related enhancement of the the   intracranial internal carotid and vertebral arteries. The basilar artery   and the bilateral anterior, middle, and posterior cerebral arteries   demonstrate normal flow-related enhancement. No vascular aneurysm or   dissection.     IMPRESSION:    Normal vascular enhancement of the bilateral common carotid, internal   carotid, vertebral arteries and their respective intracranial branches.    SINDY BRITT M.D., RADIOLOGY RESIDENT  This document has been electronically signed.  JARROD NESBITT M.D., ATTENDING RADIOLOGIST  This document has been electronically signed. 2019 10:14AM        < end of copied text >      Interpretation of Telemetry: NSR      Physical Exam:  Appearance: [ ] Normal  [ ] abnormal [ ] NAD   Eyes: [ ] PERRL [ ] EOMI  HENT: [ ] Normal [ ] Abnormal oral mucosa [ ]NC/AT  Cardiovascular: [ ] S1 [ ] S2 [ ] RRR [ ] m/r/g [ ]edema [ ] JVP  Procedural Access Site: [ ]  hematoma [ ] tender to palpation [ ] 2+ pulse [ ] bruit [ ] Ecchymosis  Respiratory: [ ] Clear to auscultation bilaterally  Gastrointestinal: [ ] Soft [ ] tenderness[ ] distension [ ] BS  Musculoskeletal: [ ] clubbing [ ] joint deformity   Neurologic: [ ] Non-focal  Lymphatic: [ ] lymphadenopathy  Psychiatry: [ ] AAOx3  [ ] confused [ ] disoriented [ ] Mood & affect appropriate  Skin: [ ]  rashes [ ] ecchymoses [ ] cyanosis

## 2019-01-02 NOTE — PROGRESS NOTE ADULT - PROBLEM SELECTOR PLAN 1
- patient presented with chest pain, EKG with ST elevations in leads I, aVL, V2-V6 suggestive of anterior wall STEMI  - s/p PCI with POBA to the LAD and Diag for 99% stenosis  - continue heparin drip for 48 hours given high clot burden  - continue DAPT, statin  - continue lisinopril and introduce BB as tolerated   - echo in 72 hours to evaluate for thrombus - patient presented with chest pain, EKG with ST elevations in leads I, aVL, V2-V6 suggestive of anterior wall STEMI  - s/p PCI with POBA to the LAD and Diag for 99% stenosis  - continue heparin drip for 48 hours given high clot burden  - continue DAPT, statin  - continue lisinopril and lopressor  - follow up echo to evaluate LV function

## 2019-01-02 NOTE — PROGRESS NOTE ADULT - SUBJECTIVE AND OBJECTIVE BOX
Subjective: Patient seen and examined. No new events except as noted.   complaining of mild SOB, no chest pain     REVIEW OF SYSTEMS:    CONSTITUTIONAL: No weakness, fevers or chills  EYES/ENT: No visual changes;  No vertigo or throat pain   NECK: No pain or stiffness  RESPIRATORY: mild SOB   CARDIOVASCULAR: No chest pain or palpitations  GASTROINTESTINAL: No abdominal or epigastric pain. No nausea, vomiting, or hematemesis; No diarrhea or constipation. No melena or hematochezia.  GENITOURINARY: No dysuria, frequency or hematuria  NEUROLOGICAL: No numbness or weakness  SKIN: No itching, burning, rashes, or lesions   All other review of systems is negative unless indicated above.    MEDICATIONS:  MEDICATIONS  (STANDING):  aspirin enteric coated 81 milliGRAM(s) Oral daily  chlorhexidine 4% Liquid 1 Application(s) Topical <User Schedule>  famotidine    Tablet 20 milliGRAM(s) Oral daily  heparin  Infusion.  Unit(s)/Hr (10 mL/Hr) IV Continuous <Continuous>  levothyroxine 75 MICROGram(s) Oral daily  lisinopril 5 milliGRAM(s) Oral daily  metoprolol tartrate 25 milliGRAM(s) Oral two times a day  montelukast 10 milliGRAM(s) Oral daily  pantoprazole    Tablet 40 milliGRAM(s) Oral before breakfast  rosuvastatin 40 milliGRAM(s) Oral at bedtime  ticagrelor 90 milliGRAM(s) Oral two times a day      PHYSICAL EXAM:  T(C): 36.8 (01-02-19 @ 13:29), Max: 37.1 (01-02-19 @ 05:00)  HR: 78 (01-02-19 @ 17:30) (68 - 84)  BP: 120/72 (01-02-19 @ 17:30) (86/46 - 151/97)  RR: 27 (01-02-19 @ 17:30) (15 - 29)  SpO2: 94% (01-02-19 @ 17:30) (94% - 98%)  Wt(kg): --  I&O's Summary    01 Jan 2019 07:01  -  02 Jan 2019 07:00  --------------------------------------------------------  IN: 436 mL / OUT: 1501 mL / NET: -1065 mL    02 Jan 2019 07:01  -  02 Jan 2019 18:28  --------------------------------------------------------  IN: 564.5 mL / OUT: 500 mL / NET: 64.5 mL          Appearance: Normal	  HEENT:   Normal oral mucosa, PERRL, EOMI	  Lymphatic: No lymphadenopathy , no edema  Cardiovascular: Normal S1 S2, No JVD, No murmurs , Peripheral pulses palpable 2+ bilaterally  Respiratory: Lungs clear to auscultation, normal effort 	  Gastrointestinal:  Soft, Non-tender, + BS	  Skin: No rashes, No ecchymoses, No cyanosis, warm to touch  Musculoskeletal: Normal range of motion, normal strength  Psychiatry:  Mood & affect appropriate  Ext: No edema      All labs, Imaging and EKGs personally reviewed                           15.6   9.4   )-----------( 122      ( 02 Jan 2019 05:35 )             46.0               01-02    137  |  103  |  15  ----------------------------<  184<H>  4.0   |  18<L>  |  0.74    Ca    8.8      02 Jan 2019 05:35  Phos  3.0     01-02  Mg     2.2     01-02    TPro  6.9  /  Alb  4.2  /  TBili  0.5  /  DBili  x   /  AST  382<H>  /  ALT  58<H>  /  AlkPhos  53  01-02    PT/INR - ( 01 Jan 2019 11:21 )   PT: 11.0 sec;   INR: 0.97 ratio         PTT - ( 02 Jan 2019 12:31 )  PTT:85.5 sec       CARDIAC MARKERS ( 02 Jan 2019 05:35 )  x     / x     / 2506 U/L / x     / 192.8 ng/mL  CARDIAC MARKERS ( 01 Jan 2019 21:59 )  x     / x     / 3666 U/L / x     / 312.4 ng/mL  CARDIAC MARKERS ( 01 Jan 2019 15:44 )  x     / x     / 4299 U/L / x     / 381.5 ng/mL              Urinalysis Basic - ( 01 Jan 2019 23:18 )    Color: Light Yellow / Appearance: Clear / SG: >1.050 / pH: x  Gluc: x / Ketone: Moderate  / Bili: Negative / Urobili: Negative   Blood: x / Protein: Trace / Nitrite: Negative   Leuk Esterase: Negative / RBC: 1 /hpf / WBC 2 /hpf   Sq Epi: x / Non Sq Epi: 1 /hpf / Bacteria: Negative      < from: TTE with Doppler (w/Cont) (01.02.19 @ 07:47) >  Conclusions:  1. Normal mitral valve. Mild mitral regurgitation.  2. Normal trileaflet aortic valve. Minimal aortic  regurgitation.  3. Severe segmental leftventricular systolic dysfunction.  Severe hypokinesis of the septum, apex, mid-distal  anterior, distal inferior, lateral, distal inferolateral  walls. Endocardial visualization enhanced with intravenous  injection of Ultrasonic Enhancing Agent (Definity). No  obvious LV thrombus.  4. Normal right ventricular size and function.  *** No previous Echo exam.    < end of copied text >

## 2019-01-02 NOTE — PROGRESS NOTE ADULT - SUBJECTIVE AND OBJECTIVE BOX
====================  CCU MIDNIGHT ROUNDS  ====================    CORINE DAVIES  [id number]  Patient is a 68y old  Male who presents with a chief complaint of STEMI (02 Jan 2019 18:28)      ====================  SUMMARY: This is a 67 y/o M with a PMH significant for CAD (s/p PCI x 7 at [place] 3.5 years ago), DM2, and hypothyroidism who presented to the ED with 5/10 substernal CP that started after exercising on the elliptical this morning. He took 81mg ASA x 5 and lisinopril 10mg and then came to the ED. EKG showed ST elevations in leads I, aVL, V2-V6. He received brillinta 180mg, 5000U heparin bolus, and then was started on a heparin drip. S/p cath lab through the R radial and was found to have 99% occlusion of the proximal LAD and diag. He received POBA x 2 and coronary thrombectomy. He was then transferred to the CCU for further management. Pt was also s/p code stroke on 1/1 due to confusion, Head CT was negative  ====================      ====================  NEW EVENTS: Remains on heparin gtt @1100, per protocol. Pt   ====================    MEDICATIONS  (STANDING):  aspirin enteric coated 81 milliGRAM(s) Oral daily  chlorhexidine 4% Liquid 1 Application(s) Topical <User Schedule>  famotidine    Tablet 20 milliGRAM(s) Oral daily  heparin  Infusion.  Unit(s)/Hr (10 mL/Hr) IV Continuous <Continuous>  levothyroxine 75 MICROGram(s) Oral daily  lisinopril 5 milliGRAM(s) Oral daily  metoprolol tartrate 25 milliGRAM(s) Oral two times a day  montelukast 10 milliGRAM(s) Oral daily  pantoprazole    Tablet 40 milliGRAM(s) Oral before breakfast  rosuvastatin 40 milliGRAM(s) Oral at bedtime  ticagrelor 90 milliGRAM(s) Oral two times a day    MEDICATIONS  (PRN):  acetaminophen   Tablet .. 650 milliGRAM(s) Oral every 6 hours PRN Temp greater or equal to 38C (100.4F), Mild Pain (1 - 3), Moderate Pain (4 - 6)  ALBUTerol    90 MICROgram(s) HFA Inhaler 2 Puff(s) Inhalation every 6 hours PRN Shortness of Breath and/or Wheezing  benzonatate 100 milliGRAM(s) Oral three times a day PRN Cough  heparin  Injectable 4000 Unit(s) IV Push every 6 hours PRN For aPTT less than 40      ====================  VITALS (Last 12 hrs):  ====================    T(C): 36.8 (01-02-19 @ 13:29), Max: 36.8 (01-02-19 @ 13:29)  T(F): 98.3 (01-02-19 @ 13:29), Max: 98.3 (01-02-19 @ 13:29)  HR: 82 (01-02-19 @ 19:30) (72 - 82)  BP: 100/57 (01-02-19 @ 19:30) (86/46 - 120/72)  BP(mean): 71 (01-02-19 @ 19:30) (61 - 96)  RR: 27 (01-02-19 @ 19:30) (16 - 29)  SpO2: 94% (01-02-19 @ 17:30) (94% - 96%)      I&O's Summary    01 Jan 2019 07:01  -  02 Jan 2019 07:00  --------------------------------------------------------  IN: 436 mL / OUT: 1501 mL / NET: -1065 mL    02 Jan 2019 07:01  -  02 Jan 2019 20:17  --------------------------------------------------------  IN: 564.5 mL / OUT: 500 mL / NET: 64.5 mL        ====================  NEW LABS:  ====================      01-02    137  |  103  |  15  ----------------------------<  184<H>  4.0   |  18<L>  |  0.74    Ca    8.8      02 Jan 2019 05:35  Phos  3.0     01-02  Mg     2.2     01-02    TPro  6.9  /  Alb  4.2  /  TBili  0.5  /  DBili  x   /  AST  382<H>  /  ALT  58<H>  /  AlkPhos  53  01-02    PT/INR - ( 01 Jan 2019 11:21 )   PT: 11.0 sec;   INR: 0.97 ratio         PTT - ( 02 Jan 2019 12:31 )  PTT:85.5 sec  Creatine Kinase, Serum: 2506 U/L <H> (01-02-19 @ 05:35)  Creatine Kinase, Serum: 3666 U/L <H> (01-01-19 @ 21:59)    CKMB Units: 192.8 ng/mL (01-02 @ 05:35)  CKMB Units: 312.4 ng/mL (01-01 @ 21:59)        ====================  PLAN:  ====================  #Neuro  s/p code stroke, resolved  -Neuro check as per protocol    #Cardiac  AWSTEMI s/p Cath POBA x2 to LAD/D1 and coronary thrombectomy  -Continue DAPT, high dose statin  -Continue heparin gtt for 48hr  -Continue BB and Lisinopril as tolerated  -Repeat Echo in AM r/o LV thrombus      Leanna Azevedo CCU NP  [facility] #[id number]  Spectra # [id number]

## 2019-01-02 NOTE — PROGRESS NOTE ADULT - PROBLEM SELECTOR PLAN 2
- pending TSH  - continue home synthroid - patient with brief episode of AMS on 1/1, s/p code stroke with negative CT head and CTA head/neck  - MRI pending  - want to rule out embolic event give thrombectomy during cath

## 2019-01-03 VITALS — WEIGHT: 215.39 LBS

## 2019-01-03 RX ORDER — TICAGRELOR 90 MG/1
1 TABLET ORAL
Qty: 0 | Refills: 0 | COMMUNITY
Start: 2019-01-03

## 2019-01-03 RX ORDER — LISINOPRIL 2.5 MG/1
1 TABLET ORAL
Qty: 14 | Refills: 0 | OUTPATIENT
Start: 2019-01-03 | End: 2019-01-16

## 2019-01-03 RX ORDER — EMPAGLIFLOZIN 10 MG/1
1 TABLET, FILM COATED ORAL
Qty: 0 | Refills: 0 | COMMUNITY

## 2019-01-03 RX ORDER — MONTELUKAST 4 MG/1
1 TABLET, CHEWABLE ORAL
Qty: 14 | Refills: 0 | OUTPATIENT
Start: 2019-01-03 | End: 2019-01-16

## 2019-01-03 RX ORDER — METOPROLOL TARTRATE 50 MG
1 TABLET ORAL
Qty: 28 | Refills: 0 | OUTPATIENT
Start: 2019-01-03 | End: 2019-01-16

## 2019-01-03 RX ORDER — ALBUTEROL 90 UG/1
2 AEROSOL, METERED ORAL
Qty: 1 | Refills: 0 | OUTPATIENT
Start: 2019-01-03

## 2019-01-03 RX ORDER — LEVOTHYROXINE SODIUM 125 MCG
1 TABLET ORAL
Qty: 0 | Refills: 0 | COMMUNITY

## 2019-01-03 RX ORDER — METOPROLOL TARTRATE 50 MG
1 TABLET ORAL
Qty: 0 | Refills: 0 | COMMUNITY
Start: 2019-01-03

## 2019-01-03 RX ORDER — ASPIRIN/CALCIUM CARB/MAGNESIUM 324 MG
1 TABLET ORAL
Qty: 14 | Refills: 0 | OUTPATIENT
Start: 2019-01-03 | End: 2019-01-16

## 2019-01-03 RX ORDER — ASPIRIN/CALCIUM CARB/MAGNESIUM 324 MG
1 TABLET ORAL
Qty: 0 | Refills: 0 | COMMUNITY

## 2019-01-03 RX ORDER — EMPAGLIFLOZIN 10 MG/1
1 TABLET, FILM COATED ORAL
Qty: 14 | Refills: 0 | OUTPATIENT
Start: 2019-01-03 | End: 2019-01-16

## 2019-01-03 RX ORDER — ROSUVASTATIN CALCIUM 5 MG/1
1 TABLET ORAL
Qty: 0 | Refills: 0 | COMMUNITY

## 2019-01-03 RX ORDER — LEVOTHYROXINE SODIUM 125 MCG
1 TABLET ORAL
Qty: 14 | Refills: 0 | OUTPATIENT
Start: 2019-01-03 | End: 2019-01-16

## 2019-01-03 RX ORDER — ROSUVASTATIN CALCIUM 5 MG/1
1 TABLET ORAL
Qty: 14 | Refills: 0 | OUTPATIENT
Start: 2019-01-03 | End: 2019-01-16

## 2019-01-03 RX ORDER — LISINOPRIL 2.5 MG/1
1 TABLET ORAL
Qty: 0 | Refills: 0 | COMMUNITY

## 2019-01-03 RX ORDER — TICAGRELOR 90 MG/1
1 TABLET ORAL
Qty: 28 | Refills: 0 | OUTPATIENT
Start: 2019-01-03 | End: 2019-01-16

## 2019-01-03 RX ORDER — PANTOPRAZOLE SODIUM 20 MG/1
1 TABLET, DELAYED RELEASE ORAL
Qty: 0 | Refills: 0 | COMMUNITY
Start: 2019-01-03

## 2019-01-03 RX ORDER — MONTELUKAST 4 MG/1
1 TABLET, CHEWABLE ORAL
Qty: 0 | Refills: 0 | COMMUNITY

## 2019-01-03 NOTE — DISCHARGE NOTE ADULT - INSTRUCTIONS
DASH/ Low cholesterol Choose lean meats and poultry without skin and prepare them without added saturated and trans fat.  Eat fish at least twice a week. Recent research shows that eating oily fish containing omega-3 fatty acids (for example, salmon, trout and herring) may help lower your risk of death from coronary artery disease.  Select fat-free, 1 percent fat and low-fat dairy products.  Cut back on foods containing partially hydrogenated vegetable oils to reduce trans fat in your diet.   To lower cholesterol, reduce saturated fat to no more than 5 to 6 percent of total calories. For someone eating 2,000 calories a day, that’s about 13 grams of saturated fat.  Cut back on beverages and foods with added sugars.  Choose and prepare foods with little or no salt. To lower blood pressure, aim to eat no more than 2,400 milligrams of sodium per day. Reducing daily intake to 1,500 mg is desirable because it can lower blood pressure even further.  If you drink alcohol, drink in moderation. That means one drink per day if you’re a woman and two drinks  per day if you’re a man.  Follow the American Heart Association recommendations when you eat out, and keep an eye on your portion sizes.

## 2019-01-03 NOTE — DISCHARGE NOTE ADULT - HOSPITAL COURSE
This is a 69 y/o M with a PMH significant for CAD (s/p PCI x 7 at [place] 3.5 years ago), DM2, and hypothyroidism who presented to the ED with 5/10 substernal CP that started after exercising on the elliptical this morning. He took 81mg ASA x 5 and lisinopril 10mg and then came to the ED. EKG showed ST elevations in leads I, aVL, V2-V6. He received brillinta 180mg, 5000U heparin bolus, and then was started on a heparin drip. S/p cath lab through the R radial and was found to have 99% occlusion of the proximal LAD and diag. He received POBA x 2 and coronary thrombectomy. He was then transferred to the CCU for further management. Pt was also s/p code stroke on 1/1 due to confusion, Head CT was negative.     Pt frustrated being connected to IV medications and wanted to sign out AMA since in the AM.  Pt was convinced to stay by Dr. Aguilera after lengthy discussion. Overnight continues to be frustrated with being unable to sleep due to noise from monitor/IV pump.  Pt insisted on signing AMA, risk was discussed with the patient at length including risk of death. Pt demonstrating clear understanding. Pt signed AMA and ambulated out off the unit on his own. This is a 69 y/o M with a PMH significant for CAD (s/p PCI x 7 at [place] 3.5 years ago), DM2, and hypothyroidism who presented to the ED with 5/10 substernal CP that started after exercising on the elliptical this morning. He took 81mg ASA x 5 and lisinopril 10mg and then came to the ED. EKG showed ST elevations in leads I, aVL, V2-V6. He received brillinta 180mg, 5000U heparin bolus, and then was started on a heparin drip. S/p cath lab through the R radial and was found to have 99% occlusion of the proximal LAD and diag. He received POBA x 2 and coronary thrombectomy. He was then transferred to the CCU for further management. Pt was also s/p code stroke on 1/1 due to confusion, Head CT was negative.     Pt frustrated during the day with being in the hospital and threatening to leave AMA. Attending cardiologist Dr Aguilera had a lengthy discussion with the patient regarding risks of leaving AMA and patient agreed to stay. Overnight pt continues to be frustrated with being unable to sleep due to noise from monitor/IV pump. Pt kept getting out of bed, disconnecting himself from the IV heparin gtt and taking tele leads off. He insisted on signing out AMA and called his wife to pick him up. He again disconnected himself from the IV, took tele leads off, and put his regular clothes on. Another lengthy discussion had with patient going over risks of leaving AMA including death. patient no longer wants in-patient treatment and demonstrated clear understanding of current situation. Pt signed AMA and ambulated out off the unit on his own. He refused to stay for proper discharge instructions. Medications sent to [facility] pharmacy.

## 2019-01-03 NOTE — DISCHARGE NOTE ADULT - MEDICATION SUMMARY - MEDICATIONS TO TAKE
I will START or STAY ON the medications listed below when I get home from the hospital:    aspirin 81 mg oral tablet  -- 1 tab(s) by mouth once a day  -- Indication: For ST elevation myocardial infarction (STEMI)    lisinopril 5 mg oral tablet  -- 1 tab(s) by mouth once a day  -- Indication: For ST elevation myocardial infarction (STEMI)    Jardiance 10 mg oral tablet  -- 1 tab(s) by mouth once a day (in the morning)  -- Indication: For Diabetes    Crestor 40 mg oral tablet  -- 1 tab(s) by mouth once a day (at bedtime)  -- Indication: For High cholesterol    Brilinta (ticagrelor) 90 mg oral tablet  -- 1 tab(s) by mouth 2 times a day  -- Indication: For ST elevation myocardial infarction (STEMI)    Metoprolol Tartrate 25 mg oral tablet  -- 1 tab(s) by mouth 2 times a day  -- Indication: For ST elevation myocardial infarction (STEMI)    ProAir RespiClick 90 mcg/inh inhalation powder  -- 2 puff(s) inhaled every 6 hours, As Needed for shortness of breathe or wheezing  -- For inhalation only.  It is very important that you take or use this exactly as directed.  Do not skip doses or discontinue unless directed by your doctor.  Obtain medical advice before taking any non-prescription drugs as some may affect the action of this medication.    -- Indication: For SOB    Singulair 10 mg oral tablet  -- 1 tab(s) by mouth once a day  -- Indication: For Asthma    Synthroid 75 mcg (0.075 mg) oral tablet  -- 1 tab(s) by mouth once a day  -- Indication: For Hypothyroid I will START or STAY ON the medications listed below when I get home from the hospital:    aspirin 81 mg oral tablet  -- 1 tab(s) by mouth once a day  -- Indication: For Stented coronary artery    lisinopril 5 mg oral tablet  -- 1 tab(s) by mouth once a day  -- Indication: For HTN (hypertension)    Jardiance 10 mg oral tablet  -- 1 tab(s) by mouth once a day (in the morning)  -- Indication: For Diabetes    Crestor 40 mg oral tablet  -- 1 tab(s) by mouth once a day (at bedtime)  -- Indication: For High cholesterol    Brilinta (ticagrelor) 90 mg oral tablet  -- 1 tab(s) by mouth 2 times a day  -- Indication: For Stented coronary artery    Metoprolol Tartrate 25 mg oral tablet  -- 1 tab(s) by mouth 2 times a day  -- Indication: For HTN (hypertension)    ProAir RespiClick 90 mcg/inh inhalation powder  -- 2 puff(s) inhaled every 6 hours, As Needed for shortness of breathe or wheezing  -- For inhalation only.  It is very important that you take or use this exactly as directed.  Do not skip doses or discontinue unless directed by your doctor.  Obtain medical advice before taking any non-prescription drugs as some may affect the action of this medication.    -- Indication: For Asthma    Singulair 10 mg oral tablet  -- 1 tab(s) by mouth once a day  -- Indication: For Asthma    Synthroid 75 mcg (0.075 mg) oral tablet  -- 1 tab(s) by mouth once a day  -- Indication: For Hypothyroid

## 2019-01-03 NOTE — DISCHARGE NOTE ADULT - PATIENT PORTAL LINK FT
You can access the [facility] Patient Portal, offered by [facility], by registering with the following website: [email or web address]

## 2019-01-03 NOTE — DISCHARGE NOTE ADULT - CARE PROVIDER_API CALL
Kemal Aguilera), Cardiology; Internal Medicine  [address] [address]  Phone: [phone]  Fax: [phone]

## 2019-01-03 NOTE — DISCHARGE NOTE ADULT - MEDICATION SUMMARY - MEDICATIONS TO STOP TAKING
I will STOP taking the medications listed below when I get home from the hospital:    Victoza  --  subcutaneous

## 2019-01-03 NOTE — DISCHARGE NOTE ADULT - PLAN OF CARE
Remain chest pain free Take all medications as directed. Follow up with your doctors within 3 days from being discharged. Please continue taking all medication as prescribed. Follow up with doctor for close monitoring. Please continue taking all medication as prescribed. Follow up with doctor for close monitoring.  Do not stop your Asprin or Brilinta unless instructed to do so by your cardiologist.

## 2019-01-03 NOTE — DISCHARGE NOTE ADULT - CARE PLAN
Principal Discharge DX:	ST elevation myocardial infarction (STEMI), unspecified artery  Goal:	Remain chest pain free  Assessment and plan of treatment:	Take all medications as directed. Principal Discharge DX:	ST elevation myocardial infarction (STEMI), unspecified artery  Goal:	Follow up with your doctors within 3 days from being discharged.  Assessment and plan of treatment:	Please continue taking all medication as prescribed. Follow up with doctor for close monitoring. Principal Discharge DX:	ST elevation myocardial infarction (STEMI), unspecified artery  Goal:	Follow up with your doctors within 3 days from being discharged.  Assessment and plan of treatment:	Please continue taking all medication as prescribed. Follow up with doctor for close monitoring.  Do not stop your Asprin or Brilinta unless instructed to do so by your cardiologist.

## 2019-01-03 NOTE — DISCHARGE NOTE ADULT - ADDITIONAL INSTRUCTIONS
follow up with cardiologist and PCP within 1 week follow up with cardiologist and PCP within 1 week.  No heavy lifting or pushing/pulling with procedure arm for 2 weeks. No driving for 2 days. You may shower 24 hours following the procedure but avoid baths/swimming for 1 week. Check your wrist site for bleeding and/or swelling daily following procedure and call your doctor immediately if it occurs or if you experience increased pain at the site. Follow up with your cardiologist in 1-2 weeks. You may call [facility] if you have any questions/concerns regarding your procedure [phone].

## 2019-01-07 LAB
CULTURE RESULTS: SIGNIFICANT CHANGE UP
CULTURE RESULTS: SIGNIFICANT CHANGE UP
SPECIMEN SOURCE: SIGNIFICANT CHANGE UP
SPECIMEN SOURCE: SIGNIFICANT CHANGE UP

## 2019-02-07 ENCOUNTER — APPOINTMENT (OUTPATIENT)
Dept: PULMONOLOGY | Facility: CLINIC | Age: 69
End: 2019-02-07
Payer: COMMERCIAL

## 2019-02-07 ENCOUNTER — NON-APPOINTMENT (OUTPATIENT)
Age: 69
End: 2019-02-07

## 2019-02-07 VITALS
OXYGEN SATURATION: 97 % | RESPIRATION RATE: 17 BRPM | HEART RATE: 57 BPM | DIASTOLIC BLOOD PRESSURE: 76 MMHG | WEIGHT: 205 LBS | BODY MASS INDEX: 32.95 KG/M2 | SYSTOLIC BLOOD PRESSURE: 120 MMHG | HEIGHT: 66 IN

## 2019-02-07 DIAGNOSIS — R49.0 DYSPHONIA: ICD-10-CM

## 2019-02-07 DIAGNOSIS — I51.9 HEART DISEASE, UNSPECIFIED: ICD-10-CM

## 2019-02-07 PROCEDURE — 95012 NITRIC OXIDE EXP GAS DETER: CPT

## 2019-02-07 PROCEDURE — 94010 BREATHING CAPACITY TEST: CPT

## 2019-02-07 PROCEDURE — 99214 OFFICE O/P EST MOD 30 MIN: CPT | Mod: 25

## 2019-02-07 RX ORDER — FLUTICASONE FUROATE AND VILANTEROL TRIFENATATE 100; 25 UG/1; UG/1
100-25 POWDER RESPIRATORY (INHALATION)
Qty: 1 | Refills: 5 | Status: DISCONTINUED | OUTPATIENT
Start: 2018-12-18 | End: 2019-02-07

## 2019-02-07 RX ORDER — NYSTATIN 100000 [USP'U]/ML
100000 SUSPENSION ORAL
Qty: 1 | Refills: 0 | Status: ACTIVE | COMMUNITY
Start: 2019-02-07 | End: 1900-01-01

## 2019-02-07 NOTE — PROCEDURE
[FreeTextEntry1] : PFT- spi reveals  mild restrictive dysfunction); FEV1 was 2.25 L which is 80% of predicted; normal flow volume loop\par \par His EKG from 1/9/2019 revealed normal mitral valve, mild mitral regurgitation, normal trileaflet aortic valve, minimal aortic regurgitation, severe segmental left ventricular systolic dysfunction. Severe hypokinesis of the septum, apex, mid-distal anterior, distal inferior, lateral, distal inferolateral walls. Endocardial visualization enhanced with intravenous injection of Ultrasonic Enhancing Agent (Definity). No obvious LV thrombus. Normal right ventricular size and function.\par \par FENO was 15; a normal value being less than 25\par \par Fractional exhaled nitric oxide (FENO) is regarded as a simple, noninvasive method for assessing eosinophilic airway inflammation. Produced by a variety of cells within the lung, nitric oxide (NO) concentrations are generally low in healthy individuals. However, high concentrations of NO appear to be involved in nonspecific host defense mechanisms and chronic inflammatory diseases such as asthma. The American Thoracic Society (ATS) therefore has recommended using FENO to aid in the diagnosis and monitoring of eosinophilic airway inflammation and asthma, and for identifying steroid responsive individuals whose chronic respiratory symptoms may be caused by airway inflammation.

## 2019-02-07 NOTE — HISTORY OF PRESENT ILLNESS
[FreeTextEntry1] : Mr. DAVIES is a 69 year year old male with a history of DM, hypothyroid, recent MI, idiopathic urticaria, asthma, allergic rhinitis, GERD who presents for a follow-up pulmonary evaluation. His chief complaint is dry throat/hoarseness.\par -he was good until December 2018\par -he then thought he had a cold in his upper throat only (not nose)\par -he was given an inhaler and nasal spray which helped somewhat but nasal spray gave him epistaxis and dry powder inhaler gave him hoarseness\par -he has stopped the inhaler somewhat but still has hoarseness\par -he gets dry throat, does not seem to be in lungs\par -he denies shortness of breath or wheezing\par -he denies shortness of breath while laying down\par -he has itchiness all over his body (eyes and eyes, included) and his itchiness is year round but worse in winter\par -he had an MI in January 2019 (blood clot)\par -he denies SOB when climbing stairs or exercising\par -his throat is very dry, does not feel like a lump\par -his sinuses feel clogged\par -his sleep is fine\par -he says his wife says he snores\par -he does not wake up tired\par -his memory/concentration is fine\par -his weight is a little up since he took a trip in January 2019\par -he denies any headaches, nausea, vomiting, fever, chills, sweats, chest pain, chest pressure, diarrhea, constipation, dysphagia, dizziness, leg swelling, leg pain, heartburn, reflux, or sour taste in the mouth.\par

## 2019-02-07 NOTE — ASSESSMENT
[FreeTextEntry1] : 69 year old male presents for initial pulmonary evaluation of DM, hypothyroid, recent MI, idiopathic urticaria, asthma, allergic rhinitis, GERD who returns for follow-up pulmonary evaluation.\par \par Problem # 1 cough\par His cough is multifactorial due to:\par -PND/allergy\par -asthma\par -LPR (reflux)\par \par Problem # 2 Allergic Rhinitis / sinus\par -Add Xlear\par -Add Olopatadine  0.6% 1 sniff each nostril BID\par \par Problem # 3 Asthma\par -Add Singulair 10 mg QHS\par -Add Symbicort 160 mg 2 puffs BID\par \par Problem #4: GERD\par -Continue Ranitidine 300 mg QHS\par -Do not eat too much, \par \par Problem #5: Chronic idiopathic urticaria\par -Add blood work for allergies: asthma panel, food IgE level, eosinophil level, Vitamin E level.\par \par Problem #6: primary snoring\par -Recommend OxyAid\par \par Problem #7: Dysphonia\par -Recommend good oral hygiene\par -Add Nystatin 10 cc up to 3 times per day\par \par Problem #8: cardiac\par -Follow-up with Dr. Garcia Mann\par \par Problem # 9 Health Maintenance\par Patient had 2018 flu vaccine \par Patient to check with PCP pneumonia vaccine status. \par \par Return to office in 3 weeks for follow up. \par Call for any questions, changes, or concerns

## 2019-02-07 NOTE — PHYSICAL EXAM

## 2019-02-07 NOTE — ADDENDUM
[FreeTextEntry1] : Documented by Omar Moreno acting as a scribe for Dr. Priyank Zamora on 02/07/2019.\par All medical record entries made by the Scribe were at my, Dr. Priyank Zamora's, direction and personally dictated by me on 02/07/2019. I have reviewed the chart and agree that the record accurately reflects my personal performance of the history, physical exam, assessment and plan. I have also personally directed, reviewed, and agree with the discharge instructions.\par

## 2019-02-07 NOTE — REASON FOR VISIT
[Follow-Up] : a follow-up visit [FreeTextEntry1] : DM, hypothyroid, recent MI, idiopathic urticaria, asthma, allergic rhinitis, GERD

## 2019-05-09 ENCOUNTER — NON-APPOINTMENT (OUTPATIENT)
Age: 69
End: 2019-05-09

## 2019-05-09 ENCOUNTER — APPOINTMENT (OUTPATIENT)
Dept: PULMONOLOGY | Facility: CLINIC | Age: 69
End: 2019-05-09
Payer: COMMERCIAL

## 2019-05-09 VITALS
OXYGEN SATURATION: 97 % | HEIGHT: 66 IN | WEIGHT: 203 LBS | SYSTOLIC BLOOD PRESSURE: 110 MMHG | DIASTOLIC BLOOD PRESSURE: 80 MMHG | BODY MASS INDEX: 32.62 KG/M2 | HEART RATE: 57 BPM | RESPIRATION RATE: 17 BRPM

## 2019-05-09 PROCEDURE — 94010 BREATHING CAPACITY TEST: CPT

## 2019-05-09 PROCEDURE — 99214 OFFICE O/P EST MOD 30 MIN: CPT | Mod: 25

## 2019-05-09 RX ORDER — BUDESONIDE AND FORMOTEROL FUMARATE DIHYDRATE 160; 4.5 UG/1; UG/1
160-4.5 AEROSOL RESPIRATORY (INHALATION) TWICE DAILY
Qty: 3 | Refills: 1 | Status: DISCONTINUED | COMMUNITY
Start: 2019-02-07 | End: 2019-05-09

## 2019-05-09 RX ORDER — INDACATEROL MALEATE AND GLYCOPYRROLATE 27.5; 15.6 UG/1; UG/1
27.5-15.6 CAPSULE RESPIRATORY (INHALATION) TWICE DAILY
Qty: 3 | Refills: 1 | Status: ACTIVE | COMMUNITY
Start: 2019-05-09 | End: 1900-01-01

## 2019-05-09 RX ORDER — BECLOMETHASONE DIPROPIONATE HFA 80 UG/1
80 AEROSOL, METERED RESPIRATORY (INHALATION) TWICE DAILY
Qty: 3 | Refills: 1 | Status: ACTIVE | COMMUNITY
Start: 2019-05-09 | End: 1900-01-01

## 2019-05-09 NOTE — PHYSICAL EXAM
[General Appearance - Well Developed] : well developed [Normal Appearance] : normal appearance [Well Groomed] : well groomed [General Appearance - Well Nourished] : well nourished [No Deformities] : no deformities [General Appearance - In No Acute Distress] : no acute distress [Normal Conjunctiva] : the conjunctiva exhibited no abnormalities [Eyelids - No Xanthelasma] : the eyelids demonstrated no xanthelasmas [Normal Oropharynx] : normal oropharynx [III] : III [Neck Appearance] : the appearance of the neck was normal [Neck Cervical Mass (___cm)] : no neck mass was observed [Jugular Venous Distention Increased] : there was no jugular-venous distention [Thyroid Nodule] : there were no palpable thyroid nodules [Thyroid Diffuse Enlargement] : the thyroid was not enlarged [Heart Sounds] : normal S1 and S2 [Heart Rate And Rhythm] : heart rate and rhythm were normal [Murmurs] : no murmurs present [Exaggerated Use Of Accessory Muscles For Inspiration] : no accessory muscle use [Respiration, Rhythm And Depth] : normal respiratory rhythm and effort [Abdomen Soft] : soft [Abdomen Tenderness] : non-tender [Abdomen Mass (___ Cm)] : no abdominal mass palpated [Abnormal Walk] : normal gait [Gait - Sufficient For Exercise Testing] : the gait was sufficient for exercise testing [Nail Clubbing] : no clubbing of the fingernails [Petechial Hemorrhages (___cm)] : no petechial hemorrhages [Cyanosis, Localized] : no localized cyanosis [Skin Color & Pigmentation] : normal skin color and pigmentation [No Venous Stasis] : no venous stasis [] : no rash [No Skin Ulcers] : no skin ulcer [Skin Lesions] : no skin lesions [No Xanthoma] : no  xanthoma was observed [Deep Tendon Reflexes (DTR)] : deep tendon reflexes were 2+ and symmetric [Oriented To Time, Place, And Person] : oriented to person, place, and time [No Focal Deficits] : no focal deficits [Sensation] : the sensory exam was normal to light touch and pinprick [Affect] : the affect was normal [Impaired Insight] : insight and judgment were intact [Auscultation Breath Sounds / Voice Sounds] : lungs were clear to auscultation bilaterally [FreeTextEntry1] : I:E 1:3; clear

## 2019-05-09 NOTE — REVIEW OF SYSTEMS
[Negative] : Sleep Disorder [Recent Wt Loss (___ Lbs)] : recent [unfilled] ~Ulb weight loss [As Noted in HPI] : as noted in HPI [Ear Disturbance] : ear disturbance

## 2019-05-09 NOTE — HISTORY OF PRESENT ILLNESS
[FreeTextEntry1] : Mr. DAVIES is a 69 year year old male with a history of asthma, allergic rhinitis, GERD, dysphonia, snoring  who presents for a follow-up pulmonary evaluation. His chief complaint is occasional cough / SOB\par -he notes he has not been using his inhalers as they have not provided relief if he coughs or SOB\par -he states he is currently feeling well\par -he reports he is busy but has been active with walking/running 4-5 miles a day \par -he notes he wakes up rested in the morning\par -he reports his blood sugar is under control\par -he notes he has difficulty hydrating adequately\par -he notes he lost 6 pounds in the past 5 months\par -he states his sinuses are clear\par -he reports his ears are frequently itchy \par -he notes he does snore as per his wife\par -he denies any dysphonia, headaches, nausea, vomiting, fever, chills, sweats, chest pain, chest pressure, diarrhea, constipation, dysphagia, dizziness, sour taste in the mouth, heartburn, reflux

## 2019-05-09 NOTE — ADDENDUM
[FreeTextEntry1] : Documented by Fletcher Mayo acting as a scribe for Dr. Priyank Zamora on 05/09/2019 \par \par All medical record entries made by the Scribe were at my, Dr. Priyank Zamora's, direction and personally dictated by me on 05/09/2019  . I have reviewed the chart and agree that the record accurately reflects my personal performance of the history, physical exam, procedure, assessment and plan. I have also personally directed, reviewed, and agree with the discharge instructions. \par \par

## 2019-05-09 NOTE — REASON FOR VISIT
[Follow-Up] : a follow-up visit [FreeTextEntry1] :  asthma, allergic rhinitis, GERD, dysphonia, snoring

## 2019-05-09 NOTE — PROCEDURE
[FreeTextEntry1] : PFT revealed mild restrictive dysfunction, normal flows, with a FEV1 of 2.13  L, which is 76% of predicted, with a normal flow volume loop\par  \par Blood work (2.19.19) reveals WBC 5.2, Hgb 14.7 and Plt 200. 100 / 2 % eosinophils. CMP normal.  total IgE of 30.1. Allergies to cat.

## 2019-05-09 NOTE — ASSESSMENT
[FreeTextEntry1] : 69 year old male presents for initial pulmonary evaluation of DM, hypothyroid, recent MI, idiopathic urticaria, asthma, allergic rhinitis, GERD who returns for follow-up pulmonary evaluation.\par \par Problem # 1 cough\par His cough is multifactorial due to:\par -PND/allergy\par -asthma\par -LPR (reflux)\par \par Problem # 2 Allergic Rhinitis / sinus\par -continue Xlear\par -continue Olopatadine  0.6% 1 sniff each nostril BID\par \par Environmental measures for allergies were encouraged including mattress and pillow cover, air purifier, and environmental controls.\par \par Problem # 3 Asthma\par -continue Singulair 10 mg QHS\par -Add Utibron 1 inhalation BID\par -add Qvar 80 2 inhalations BID \par -add Ventolin 2 inhalations PRN up to Q6H and pre-exercise \par \par Asthma is believed to be caused by inherited (genetic) and environmental factor, but its exact cause is unknown. Asthma may be triggered by allergens, lung infections, or irritants in the air. Asthma triggers are different for each person \par -Inhaler technique reviewed as well as oral hygiene techniques reviewed with patient. Avoidance of cold air, extremes of temperature, rescue inhaler should be used before exercise. Order of medication reviewed with patient. Recommended use of a cool mist humidifier in the bedroom. \par \par Problem #4: GERD\par -Continue Ranitidine 300 mg QHS\par \par -Rule of 2s: avoid eating too much, eating too fast, eating too late, eating too spicy, eating two hours before bed\par -Things to avoid including overeating, spicy foods, tight clothing, eating within three hours of bed, this list is not all inclusive. \par -For treatment of reflux, possible options discussed including diet control, H2 blockers, PPIs, as well as coating motility agents discussed as treatment options. Timing of meals and proximity of last meal to sleep were discussed. If symptoms persist, a formal gastrointestinal evaluation is needed.  \par \par Problem #5: Chronic idiopathic urticaria (winter eczema)\par -s/p blood work for allergies: asthma panel, food IgE level, eosinophil level, Vitamin E level (all normal)\par -recommended Borage with flax seed oil\par \par Problem #6: primary snoring\par -Recommend OxyAid / snoring chin strap\par \par Problem #7: Dysphonia\par -Recommend good oral hygiene\par -Add Nystatin 10 cc up to 3 times per day\par \par Problem #8: cardiac\par -Follow-up with Dr. Garcia Mann\par \par Problem #9 : health maintenance\par s/p flu shot 2018\par -recommended strep pneumonia vaccines: Prevnar-13 vaccine, followed by Pneumo vaccine 23 one year following\par -recommended early intervention for URIs\par -recommended regular osteoporosis evaluations\par -recommended early dermatological evaluations\par -recommended after the age of 50 to the age of 70, colonoscopy every 5 years \par \par Return to office in 4 months\par Call for any questions, changes, or concerns

## 2019-06-03 PROBLEM — I51.9 CARDIAC DISEASE: Status: ACTIVE | Noted: 2019-02-07

## 2019-09-12 ENCOUNTER — APPOINTMENT (OUTPATIENT)
Dept: PULMONOLOGY | Facility: CLINIC | Age: 69
End: 2019-09-12
Payer: COMMERCIAL

## 2019-09-12 ENCOUNTER — NON-APPOINTMENT (OUTPATIENT)
Age: 69
End: 2019-09-12

## 2019-09-12 VITALS
WEIGHT: 205 LBS | DIASTOLIC BLOOD PRESSURE: 80 MMHG | RESPIRATION RATE: 17 BRPM | HEIGHT: 66 IN | HEART RATE: 57 BPM | SYSTOLIC BLOOD PRESSURE: 120 MMHG | OXYGEN SATURATION: 98 % | BODY MASS INDEX: 32.95 KG/M2

## 2019-09-12 PROCEDURE — 95012 NITRIC OXIDE EXP GAS DETER: CPT

## 2019-09-12 PROCEDURE — 99214 OFFICE O/P EST MOD 30 MIN: CPT | Mod: 25

## 2019-09-12 PROCEDURE — 94010 BREATHING CAPACITY TEST: CPT

## 2019-09-12 RX ORDER — ROSUVASTATIN CALCIUM 40 MG/1
40 TABLET, FILM COATED ORAL
Refills: 0 | Status: DISCONTINUED | COMMUNITY
End: 2019-09-12

## 2019-09-12 NOTE — REVIEW OF SYSTEMS
[Back Pain] : ~T back pain [Myalgias] : myalgias [Difficulty Maintaining Sleep] : difficulty maintaining sleep [As Noted in HPI] : as noted in HPI [Negative] : Pulmonary Hypertension

## 2019-09-12 NOTE — PROCEDURE
[FreeTextEntry1] : PFT revealed mild restrictive dysfunction, with a FEV1 of 2.16L, which is 77% of predicted, with a normal flow volume loop \par \par FENO was 19; a normal value being less than 25\par Fractional exhaled nitric oxide (FENO) is regarded as a simple, noninvasive method for assessing eosinophilic airway inflammation. Produced by a variety of cells within the lung, nitric oxide (NO) concentrations are generally low in healthy individuals. However, high concentrations of NO appear to be involved in nonspecific host defense mechanisms and chronic inflammatory diseases such as asthma. The American Thor[employer] Society (ATS) therefore has recommended using FENO to aid in the diagnosis and monitoring of eosinophilic airway inflammation and asthma, and for identifying steroid responsive individuals whose chronic respiratory symptoms may be caused by airway inflammation.

## 2019-09-12 NOTE — PHYSICAL EXAM
[General Appearance - Well Developed] : well developed [Normal Appearance] : normal appearance [Well Groomed] : well groomed [No Deformities] : no deformities [General Appearance - Well Nourished] : well nourished [General Appearance - In No Acute Distress] : no acute distress [Normal Conjunctiva] : the conjunctiva exhibited no abnormalities [Eyelids - No Xanthelasma] : the eyelids demonstrated no xanthelasmas [Normal Oropharynx] : normal oropharynx [III] : III [Neck Cervical Mass (___cm)] : no neck mass was observed [Neck Appearance] : the appearance of the neck was normal [Jugular Venous Distention Increased] : there was no jugular-venous distention [Thyroid Diffuse Enlargement] : the thyroid was not enlarged [Thyroid Nodule] : there were no palpable thyroid nodules [Heart Rate And Rhythm] : heart rate and rhythm were normal [Heart Sounds] : normal S1 and S2 [Murmurs] : no murmurs present [Respiration, Rhythm And Depth] : normal respiratory rhythm and effort [Auscultation Breath Sounds / Voice Sounds] : lungs were clear to auscultation bilaterally [Exaggerated Use Of Accessory Muscles For Inspiration] : no accessory muscle use [Abdomen Soft] : soft [Abdomen Tenderness] : non-tender [Abdomen Mass (___ Cm)] : no abdominal mass palpated [Abnormal Walk] : normal gait [Gait - Sufficient For Exercise Testing] : the gait was sufficient for exercise testing [Nail Clubbing] : no clubbing of the fingernails [Cyanosis, Localized] : no localized cyanosis [Petechial Hemorrhages (___cm)] : no petechial hemorrhages [Skin Color & Pigmentation] : normal skin color and pigmentation [No Venous Stasis] : no venous stasis [] : no rash [Skin Lesions] : no skin lesions [No Skin Ulcers] : no skin ulcer [No Xanthoma] : no  xanthoma was observed [Sensation] : the sensory exam was normal to light touch and pinprick [Deep Tendon Reflexes (DTR)] : deep tendon reflexes were 2+ and symmetric [No Focal Deficits] : no focal deficits [Oriented To Time, Place, And Person] : oriented to person, place, and time [Affect] : the affect was normal [Impaired Insight] : insight and judgment were intact [FreeTextEntry1] : inflammation in the ears

## 2019-09-12 NOTE — ADDENDUM
[FreeTextEntry1] : Documented by Eduar Pardo acting as a scribe for Dr. Priyank Zamora on 09/12/2019.\par \par All medical record entries made by the Scribe were at my, Dr. Priyank Zamora's, direction and personally dictated by me on 09/12/2019. I have reviewed the chart and agree that the record accurately reflects my personal performance of the history, physical exam, assessment and plan. I have also personally directed, reviewed, and agree with the discharge instructions.

## 2019-09-12 NOTE — HISTORY OF PRESENT ILLNESS
[FreeTextEntry1] : Mr. DAVIES is a 69 year year old male with a history of asthma, allergic rhinitis, GERD, dysphonia, snoring  who presents for a follow-up pulmonary evaluation. His chief complaint is nasal congestion\par -he reports having persistent nasal congestion, and isn't consistent with taking any medications for it\par -he notes his symptoms are generally constant throughout the day, and is unsure if there are any changes throughout the day\par -he reports he is sleeping well, except muscle pain in the shoulder, lower back and neck tends to wake him up\par -he notes he had been having adverse side effects with his Crestor, and he stopped it 2 weeks ago, and has greatly improved since coming off the medication\par -he notes he thought he had a bout of gout during the summer or just injured his toe, but it was revealed to have been Crestor\par -he notes he takes CoQ10, but not vitamin D\par -he notes his back pain was alleviated after stopping Crestor\par -He notes His bowels are regular\par -he notes his ears are itchy\par -he notes he used to exercise regularly on the elliptical before having a heart issue in the recent past\par -he denies any coughing, wheezing, dysphagia, dizziness, sour taste in the mouth, heartburn, reflux

## 2020-01-23 ENCOUNTER — APPOINTMENT (OUTPATIENT)
Dept: PULMONOLOGY | Facility: CLINIC | Age: 70
End: 2020-01-23

## 2020-03-18 RX ORDER — DESLORATADINE 5 MG/1
5 TABLET ORAL DAILY
Qty: 30 | Refills: 0 | Status: ACTIVE | COMMUNITY
Start: 2019-09-12 | End: 1900-01-01

## 2020-03-19 ENCOUNTER — RX RENEWAL (OUTPATIENT)
Age: 70
End: 2020-03-19

## 2020-06-16 ENCOUNTER — APPOINTMENT (OUTPATIENT)
Dept: PULMONOLOGY | Facility: CLINIC | Age: 70
End: 2020-06-16
Payer: COMMERCIAL

## 2020-06-16 VITALS
TEMPERATURE: 97.4 F | BODY MASS INDEX: 33.99 KG/M2 | WEIGHT: 204 LBS | DIASTOLIC BLOOD PRESSURE: 80 MMHG | HEART RATE: 57 BPM | HEIGHT: 65 IN | RESPIRATION RATE: 16 BRPM | SYSTOLIC BLOOD PRESSURE: 120 MMHG | OXYGEN SATURATION: 98 %

## 2020-06-16 PROCEDURE — 99214 OFFICE O/P EST MOD 30 MIN: CPT

## 2020-06-16 RX ORDER — FAMOTIDINE 40 MG/1
40 TABLET, FILM COATED ORAL
Qty: 90 | Refills: 1 | Status: ACTIVE | COMMUNITY
Start: 2020-06-16 | End: 1900-01-01

## 2020-06-16 RX ORDER — OMEPRAZOLE 20 MG/1
20 CAPSULE, DELAYED RELEASE ORAL DAILY
Qty: 30 | Refills: 3 | Status: DISCONTINUED | COMMUNITY
Start: 2018-12-18 | End: 2020-06-16

## 2020-06-16 RX ORDER — MOMETASONE FUROATE 1 MG/G
0.1 CREAM TOPICAL TWICE DAILY
Qty: 3 | Refills: 3 | Status: ACTIVE | COMMUNITY
Start: 2019-09-12 | End: 1900-01-01

## 2020-06-16 RX ORDER — MONTELUKAST 10 MG/1
10 TABLET, FILM COATED ORAL
Qty: 1 | Refills: 1 | Status: ACTIVE | COMMUNITY
Start: 2018-12-18

## 2020-06-16 RX ORDER — RANITIDINE HYDROCHLORIDE 300 MG/1
300 CAPSULE ORAL
Qty: 30 | Refills: 3 | Status: DISCONTINUED | COMMUNITY
Start: 2018-12-18 | End: 2020-06-16

## 2020-06-16 RX ORDER — RANITIDINE 300 MG/1
300 TABLET ORAL
Qty: 90 | Refills: 3 | Status: DISCONTINUED | COMMUNITY
Start: 2019-02-07 | End: 2020-06-16

## 2020-06-16 NOTE — ASSESSMENT
[FreeTextEntry1] : Mr. Kapoor is a 70 year old male with a history of DM, hypothyroid, recent MI, idiopathic urticaria, asthma, allergic rhinitis, GERD who returns for follow-up pulmonary evaluation. His number one issues orthopedic / allergies \par \par Problem  1: cough\par His cough is multifactorial due to:\par -PND/allergy\par -asthma\par -LPR (reflux)\par \par Problem  2: Allergic Rhinitis / sinus\par -continue Xlear\par -continue Olopatadine  0.6% 1 sniff each nostril BID\par -continue Clarinex 5 mg before bed / Xyzal 5mg QHs\par -continue Mometasone cream (.15) PRN \par \par Environmental measures for allergies were encouraged including mattress and pillow cover, air purifier, and environmental controls.\par \par Problem 3: Asthma\par -continue Singulair 10 mg QHS\par -continue Utibron 1 inhalation BID\par -continue Qvar 80 2 inhalations BID \par -continue Ventolin 2 inhalations PRN up to Q6H and pre-exercise \par \par Asthma is believed to be caused by inherited (genetic) and environmental factor, but its exact cause is unknown. Asthma may be triggered by allergens, lung infections, or irritants in the air. Asthma triggers are different for each person \par -Inhaler technique reviewed as well as oral hygiene techniques reviewed with patient. Avoidance of cold air, extremes of temperature, rescue inhaler should be used before exercise. Order of medication reviewed with patient. Recommended use of a cool mist humidifier in the bedroom. \par \par Problem 4: GERD\par -Continue Pepcid 40 mg QHS \par \par -Rule of 2s: avoid eating too much, eating too fast, eating too late, eating too spicy, eating two hours before bed\par -Things to avoid including overeating, spicy foods, tight clothing, eating within three hours of bed, this list is not all inclusive. \par -For treatment of reflux, possible options discussed including diet control, H2 blockers, PPIs, as well as coating motility agents discussed as treatment options. Timing of meals and proximity of last meal to sleep were discussed. If symptoms persist, a formal gastrointestinal evaluation is needed.  \par \par Problem 5: Chronic idiopathic urticaria (winter eczema)\par -s/p blood work for allergies: asthma panel, food IgE level, eosinophil level, Vitamin E level (all normal)\par -recommended Borage with flax seed oil\par \par Problem 6: primary snoring\par -Recommend OxyAid / snoring chin strap\par \par Problem 7: Dysphonia\par -Recommend good oral hygiene\par -continue Nystatin 10 cc up to 3 times per day PRN \par \par Problem 8: cardiac\par -Follow-up with Dr. Garcia Mann\par -Off Crestor\par -Recommended to take 200 mg coq10, and 5000 mg vitamin D QD; Take 2 weeks before restarting his next cholesterol medication, in order to offset side effects \par \par Problem 9: Health Maintenance/COVID19 Precautions:\par - Clean your hands often. Wash your hands often with soap and water for at least 20 seconds, especially after blowing your nose, coughing, or sneezing, or having been in a public place.\par - If soap and water are not available, use a hand  that contains at least 60% alcohol.\par - To the extent possible, avoid touching high-touch surfaces in public places - elevator buttons, door handles, handrails, handshaking with people, etc. Use a tissue or your sleeve to cover your hand or finger if you must touch something.\par - Wash your hands after touching surfaces in public places.\par - Avoid touching your face, nose, eyes, etc.\par - Clean and disinfect your home to remove germs: practice routine cleaning of frequently touched surfaces (for example: tables, doorknobs, light switches, handles, desks, toilets, faucets, sinks & cell phones)\par - Avoid crowds, especially in poorly ventilated spaces. Your risk of exposure to respiratory viruses like COVID-19 may increase in crowded, closed-in settings with little air circulation if there are people in the crowd who are sick. All patients are recommended to practice social distancing and stay at least 6 feet away from others.\par \par Immune Support Recommendations:\par -OTC Vitamin C 500mg BID \par -OTC Quercetin 250-500mg BID \par -OTC Zinc 75-100mg per day \par -OTC Melatonin 1 or 2 mg a night \par -OTC Vitamin D 1-4000mg per day \par -OTC Tonic Water 8oz per day\par \par \par Problem 10 : health maintenance\par s/p flu shot 2019 \par -recommended strep pneumonia vaccines: Prevnar-13 vaccine, followed by Pneumo vaccine 23 one year following\par -recommended early intervention for URIs\par -recommended regular osteoporosis evaluations\par -recommended early dermatological evaluations\par -recommended after the age of 50 to the age of 70, colonoscopy every 5 years \par \par Return to office in 4 months\par Call for any questions, changes, or concerns

## 2020-06-16 NOTE — ADDENDUM
[FreeTextEntry1] : Documented by Trixie Smith acting as a scribe for Dr. Priyank Zamora on 06/16/2020.\par \par All medical record entries made by the Scribe were at my, Dr. Priyank Zamora's, direction and personally dictated by me on 06/16/2020. I have reviewed the chart and agree that the record accurately reflects my personal performance of the history, physical exam, assessment and plan. I have also personally directed, reviewed, and agree with the discharge instructions.

## 2020-06-16 NOTE — HISTORY OF PRESENT ILLNESS
[FreeTextEntry1] : Mr. DAVIES is a 70 year year old male with a history of asthma, allergic rhinitis, GERD, dysphonia, snoring  who presents for a follow-up pulmonary evaluation. His chief complaint is \par - his energy levels have been fine\par - he has been generally feeling well\par - he's going through the pollen season, his allergies have been giving him a tough time but [place] he [place]. \par - he denies heart burn / reflux\par - he complains of lower back pain, he takes 2 Aleve in the morning and does a stretching routine. \par - he notes he has terrible itchy ears but his eyes are fine \par - he has been sneezing from the pollen \par - he denies any SOB \par - his weight has been stable \par - he has been exercising a couple times a week, he uses the Pelaton and doing weights and a leg machine for his knees. \par - He's usually mostly standing during the day. \par - he can't sleep on his back due to a herniated disc but he sleeps on his side. \par - denies lump in throat to clear \par - denies coughing / wheezing \par \par -he denies any headaches, nausea, vomiting, fever, chills, sweats, chest pain, chest pressure, diarrhea, constipation, dysphagia, dizziness, sour taste in the mouth, leg swelling, leg pain, itchy eyes, heartburn, reflux, myalgias or arthralgias.

## 2020-06-16 NOTE — PHYSICAL EXAM
[General Appearance - Well Developed] : well developed [Normal Appearance] : normal appearance [Well Groomed] : well groomed [No Deformities] : no deformities [General Appearance - Well Nourished] : well nourished [General Appearance - In No Acute Distress] : no acute distress [Normal Conjunctiva] : the conjunctiva exhibited no abnormalities [Eyelids - No Xanthelasma] : the eyelids demonstrated no xanthelasmas [Normal Oropharynx] : normal oropharynx [Neck Appearance] : the appearance of the neck was normal [Thyroid Diffuse Enlargement] : the thyroid was not enlarged [Neck Cervical Mass (___cm)] : no neck mass was observed [Jugular Venous Distention Increased] : there was no jugular-venous distention [Thyroid Nodule] : there were no palpable thyroid nodules [Heart Sounds] : normal S1 and S2 [Murmurs] : no murmurs present [Heart Rate And Rhythm] : heart rate and rhythm were normal [Respiration, Rhythm And Depth] : normal respiratory rhythm and effort [Auscultation Breath Sounds / Voice Sounds] : lungs were clear to auscultation bilaterally [Exaggerated Use Of Accessory Muscles For Inspiration] : no accessory muscle use [Abdomen Soft] : soft [Abdomen Mass (___ Cm)] : no abdominal mass palpated [Abdomen Tenderness] : non-tender [Gait - Sufficient For Exercise Testing] : the gait was sufficient for exercise testing [Abnormal Walk] : normal gait [Nail Clubbing] : no clubbing of the fingernails [Petechial Hemorrhages (___cm)] : no petechial hemorrhages [Cyanosis, Localized] : no localized cyanosis [Skin Color & Pigmentation] : normal skin color and pigmentation [] : no rash [No Venous Stasis] : no venous stasis [No Xanthoma] : no  xanthoma was observed [No Skin Ulcers] : no skin ulcer [Skin Lesions] : no skin lesions [Sensation] : the sensory exam was normal to light touch and pinprick [Deep Tendon Reflexes (DTR)] : deep tendon reflexes were 2+ and symmetric [No Focal Deficits] : no focal deficits [Impaired Insight] : insight and judgment were intact [Oriented To Time, Place, And Person] : oriented to person, place, and time [Affect] : the affect was normal [II] : II [FreeTextEntry1] : I:E 1:3; clear

## 2020-10-18 ENCOUNTER — RX RENEWAL (OUTPATIENT)
Age: 70
End: 2020-10-18

## 2020-10-18 RX ORDER — LEVOCETIRIZINE DIHYDROCHLORIDE 5 MG/1
5 TABLET ORAL
Qty: 30 | Refills: 5 | Status: ACTIVE | COMMUNITY
Start: 2020-03-19 | End: 1900-01-01

## 2020-12-01 ENCOUNTER — APPOINTMENT (OUTPATIENT)
Dept: PULMONOLOGY | Facility: CLINIC | Age: 70
End: 2020-12-01
Payer: COMMERCIAL

## 2020-12-01 VITALS
OXYGEN SATURATION: 96 % | RESPIRATION RATE: 16 BRPM | BODY MASS INDEX: 33.59 KG/M2 | WEIGHT: 209 LBS | DIASTOLIC BLOOD PRESSURE: 60 MMHG | SYSTOLIC BLOOD PRESSURE: 105 MMHG | HEIGHT: 66 IN | TEMPERATURE: 97.6 F | HEART RATE: 60 BPM

## 2020-12-01 DIAGNOSIS — L29.9 PRURITUS, UNSPECIFIED: ICD-10-CM

## 2020-12-01 PROCEDURE — 99214 OFFICE O/P EST MOD 30 MIN: CPT

## 2020-12-01 PROCEDURE — 99072 ADDL SUPL MATRL&STAF TM PHE: CPT

## 2020-12-01 NOTE — HISTORY OF PRESENT ILLNESS
[FreeTextEntry1] : Mr. DAVIES is a 70 year year old male with a history of asthma, allergic rhinitis, GERD, dysphonia, snoring  who presents for a follow-up pulmonary evaluation. His chief complaint is \par \par -He has been generally feeling well\par -His energy level is a 9/10\par -He is working regularly in the office\par -He notes not exercising as much as he would like, but he is using his Pelaton \par -His weight is stable \par -He denies SOB, on his back or in the middle of the night\par -He denies coughing and wheezing\par -He notes he sleeps well, but wakes up in the middle of the night due to back pain\par -He typically sleeps on his side \par -His allergies are controlled \par -He notes having dry nostrils \par -\par - denies any headaches, nausea, vomiting, fever, chills, sweats, chest pain, chest pressure, diarrhea, constipation, dysphagia, dizziness, leg swelling, leg pain, itchy eyes, itchy ears, heartburn, reflux, or sour taste in the mouth.

## 2020-12-01 NOTE — ASSESSMENT
[FreeTextEntry1] : Mr. Kapoor is a 70 year old male with a history of DM, hypothyroid, MI, idiopathic urticaria, asthma, allergic rhinitis, GERD who returns for follow-up pulmonary evaluation. His number one issues orthopedic (back)\par \par Problem  1: cough -Controlled \par His cough is multifactorial due to:\par -PND/allergy\par -asthma\par -LPR (reflux)\par \par Problem  2: Allergic Rhinitis / sinus\par -continue Xlear/ Boroleum \par -continue Olopatadine  0.6% 1 sniff each nostril BID\par -continue Clarinex 5 mg before bed / Xyzal 5mg QHs\par -continue Mometasone cream (.15) PRN for eczema \par \par Environmental measures for allergies were encouraged including mattress and pillow cover, air purifier, and environmental controls.\par \par Problem 3: Asthma (stable)\par -continue Singulair 10 mg QHS\par -continue Utibron 1 inhalation BID\par -continue Qvar 80 2 inhalations BID \par -continue Ventolin 2 inhalations PRN up to Q6H and pre-exercise \par \par Asthma is believed to be caused by inherited (genetic) and environmental factor, but its exact cause is unknown. Asthma may be triggered by allergens, lung infections, or irritants in the air. Asthma triggers are different for each person \par -Inhaler technique reviewed as well as oral hygiene techniques reviewed with patient. Avoidance of cold air, extremes of temperature, rescue inhaler should be used before exercise. Order of medication reviewed with patient. Recommended use of a cool mist humidifier in the bedroom. \par \par Problem 4: GERD\par -Continue Pepcid 40 mg QHS \par \par -Rule of 2s: avoid eating too much, eating too fast, eating too late, eating too spicy, eating two hours before bed\par -Things to avoid including overeating, spicy foods, tight clothing, eating within three hours of bed, this list is not all inclusive. \par -For treatment of reflux, possible options discussed including diet control, H2 blockers, PPIs, as well as coating motility agents discussed as treatment options. Timing of meals and proximity of last meal to sleep were discussed. If symptoms persist, a formal gastrointestinal evaluation is needed.  \par \par Problem 5: Chronic idiopathic urticaria (winter eczema)\par -s/p blood work for allergies: asthma panel, food IgE level, eosinophil level, Vitamin E level (all normal)\par -recommended Borage with flax seed oil\par \par Problem 6: primary snoring\par -Recommend OxyAid / snoring chin strap\par \par Problem 7: Dysphonia\par -Recommend good oral hygiene\par -continue Nystatin 10 cc up to 3 times per day PRN \par \par Problem 8: cardiac\par -Follow-up with Dr. Garcia Mann\par -Off Crestor\par -Recommended to take 200 mg coq10, and 5000 mg vitamin D QD; Take 2 weeks before restarting his next cholesterol medication, in order to offset side effects \par \par Problem 9: Health Maintenance/COVID19 Precautions:\par - Clean your hands often. Wash your hands often with soap and water for at least 20 seconds, especially after blowing your nose, coughing, or sneezing, or having been in a public place.\par - If soap and water are not available, use a hand  that contains at least 60% alcohol.\par - To the extent possible, avoid touching high-touch surfaces in public places - elevator buttons, door handles, handrails, handshaking with people, etc. Use a tissue or your sleeve to cover your hand or finger if you must touch something.\par - Wash your hands after touching surfaces in public places.\par - Avoid touching your face, nose, eyes, etc.\par - Clean and disinfect your home to remove germs: practice routine cleaning of frequently touched surfaces (for example: tables, doorknobs, light switches, handles, desks, toilets, faucets, sinks & cell phones)\par - Avoid crowds, especially in poorly ventilated spaces. Your risk of exposure to respiratory viruses like COVID-19 may increase in crowded, closed-in settings with little air circulation if there are people in the crowd who are sick. All patients are recommended to practice social distancing and stay at least 6 feet away from others.\par \par Immune Support Recommendations:\par -OTC Vitamin C 500mg BID \par -OTC Quercetin 250-500mg BID \par -OTC Zinc 75-100mg per day \par -OTC Melatonin 1 or 2 mg a night \par -OTC Vitamin D 1-4000mg per day \par -OTC Tonic Water 8oz per day\par \par \par Problem 10 : health maintenance\par s/p flu shot 2020\par -recommended strep pneumonia vaccines: Prevnar-13 vaccine, followed by Pneumo vaccine 23 one year following (completed)\par -recommended early intervention for URIs\par -recommended regular osteoporosis evaluations\par -recommended early dermatological evaluations\par -recommended after the age of 50 to the age of 70, colonoscopy every 5 years \par \par Return to office in 4 months\par Call for any questions, changes, or concerns

## 2020-12-01 NOTE — ADDENDUM
[FreeTextEntry1] : Documented by Berna Singleton acting as a scribe for Dr. Priyank Zamora on (12/01/2020).\par \par All medical record entries made by the Scribe were at my, Dr. Priyank Zamora's, direction and personally dictated by me on (12/01/2020). I have reviewed the chart and agree that the record accurately reflects my personal performance of the history, physical exam, assessment and plan. I have also personally directed, reviewed, and agree with the discharge instructions. \par \par \par

## 2021-04-01 DIAGNOSIS — Z01.812 ENCOUNTER FOR PREPROCEDURAL LABORATORY EXAMINATION: ICD-10-CM

## 2021-04-14 ENCOUNTER — APPOINTMENT (OUTPATIENT)
Dept: PULMONOLOGY | Facility: CLINIC | Age: 71
End: 2021-04-14

## 2021-12-28 NOTE — ASSESSMENT
[FreeTextEntry1] : Mr. Kapoor is a 69 year old male with a history of DM, hypothyroid, recent MI, idiopathic urticaria, asthma, allergic rhinitis, GERD who returns for follow-up pulmonary evaluation. His number one issues are related to Crestor, and allergies.\par \par Problem # 1 cough\par His cough is multifactorial due to:\par -PND/allergy\par -asthma\par -LPR (reflux)\par \par Problem # 2 Allergic Rhinitis / sinus\par -continue Xlear\par -continue Olopatadine  0.6% 1 sniff each nostril BID\par -Add Clarinex 5 mg before bed \par -Add Mometasone cream (.15)\par \par Environmental measures for allergies were encouraged including mattress and pillow cover, air purifier, and environmental controls.\par \par Problem # 3 Asthma\par -continue Singulair 10 mg QHS\par -continue Utibron 1 inhalation BID\par -continue Qvar 80 2 inhalations BID \par -continue Ventolin 2 inhalations PRN up to Q6H and pre-exercise \par \par Asthma is believed to be caused by inherited (genetic) and environmental factor, but its exact cause is unknown. Asthma may be triggered by allergens, lung infections, or irritants in the air. Asthma triggers are different for each person \par -Inhaler technique reviewed as well as oral hygiene techniques reviewed with patient. Avoidance of cold air, extremes of temperature, rescue inhaler should be used before exercise. Order of medication reviewed with patient. Recommended use of a cool mist humidifier in the bedroom. \par \par Problem #4: GERD\par -Continue Ranitidine 300 mg QHS\par \par -Rule of 2s: avoid eating too much, eating too fast, eating too late, eating too spicy, eating two hours before bed\par -Things to avoid including overeating, spicy foods, tight clothing, eating within three hours of bed, this list is not all inclusive. \par -For treatment of reflux, possible options discussed including diet control, H2 blockers, PPIs, as well as coating motility agents discussed as treatment options. Timing of meals and proximity of last meal to sleep were discussed. If symptoms persist, a formal gastrointestinal evaluation is needed.  \par \par Problem #5: Chronic idiopathic urticaria (winter eczema)\par -s/p blood work for allergies: asthma panel, food IgE level, eosinophil level, Vitamin E level (all normal)\par -recommended Borage with flax seed oil\par \par Problem #6: primary snoring\par -Recommend OxyAid / snoring chin strap\par \par Problem #7: Dysphonia\par -Recommend good oral hygiene\par -Add Nystatin 10 cc up to 3 times per day\par \par Problem #8: cardiac\par -Follow-up with Dr. Garcia Mann\par -Off Crestor\par -Recommended to take 200 mg coq10, and 5000 mg vitamin D QD; Take 2 weeks before restarting his next cholesterol medication, in order to offset side effects \par \par Problem #9 : health maintenance\par s/p flu shot 2018\par -recommended strep pneumonia vaccines: Prevnar-13 vaccine, followed by Pneumo vaccine 23 one year following\par -recommended early intervention for URIs\par -recommended regular osteoporosis evaluations\par -recommended early dermatological evaluations\par -recommended after the age of 50 to the age of 70, colonoscopy every 5 years \par \par Return to office in 4 months\par Call for any questions, changes, or concerns  Imiquimod Pregnancy And Lactation Text: This medication is Pregnancy Category C. It is unknown if this medication is excreted in breast milk.

## 2022-05-25 ENCOUNTER — APPOINTMENT (OUTPATIENT)
Dept: ORTHOPEDIC SURGERY | Facility: CLINIC | Age: 72
End: 2022-05-25
Payer: MEDICARE

## 2022-05-25 VITALS
WEIGHT: 210 LBS | DIASTOLIC BLOOD PRESSURE: 81 MMHG | HEART RATE: 62 BPM | SYSTOLIC BLOOD PRESSURE: 146 MMHG | HEIGHT: 66 IN | BODY MASS INDEX: 33.75 KG/M2

## 2022-05-25 DIAGNOSIS — M17.12 UNILATERAL PRIMARY OSTEOARTHRITIS, LEFT KNEE: ICD-10-CM

## 2022-05-25 PROCEDURE — 73564 X-RAY EXAM KNEE 4 OR MORE: CPT | Mod: LT

## 2022-05-25 PROCEDURE — 99203 OFFICE O/P NEW LOW 30 MIN: CPT

## 2022-05-26 PROBLEM — M17.12 PRIMARY OSTEOARTHRITIS OF LEFT KNEE: Status: ACTIVE | Noted: 2022-05-26

## 2022-05-26 NOTE — DISCUSSION/SUMMARY
[de-identified] : 73 y/o male with left knee pain. 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 Described that this is likely due to overuse, and age-related "wear and tear". Pain may be related to breakdown of the chondral surfaces, cartilage, or ligaments of the knee.\par \par Recommendation: Observation of symptoms. Trial NSAIDs. Begin trial of PT, Rx given. \par \par Follow up as needed if pain persists for further evaluation and treatment.

## 2022-05-26 NOTE — ADDENDUM
[FreeTextEntry1] : This note was written by Laura Don on 05/25/2022 acting solely as a scribe for Dr. Arya Doran.\par \par All medical record entries made by the Scribe were at my, Dr. Arya Doran, direction and personally dictated by me on 05/25/2022. I have personally reviewed the chart and agree that the record accurately reflects my personal performance of the history, physical exam, assessment and plan.

## 2022-05-26 NOTE — HISTORY OF PRESENT ILLNESS
[de-identified] : 72 year old male presents today with left knee pain x 1 week. He missed a step and landed hard on his left leg and noticed pain 2 days after. The pain is constant worse with walking and standing. Localizes pain to the medial aspect of the knee. Advil is helpful. He is unable to put full weight on the leg due to pain. Today is the first stay since the injury without using the cane. He visited a PT yesterday which provided some relief and would like to continue. \par \par The patient's past medical history, past surgical history, medications and allergies were reviewed by me today with the patient and documented accordingly. In addition, the patient's family and social history, which were noncontributory to this visit, were reviewed also.

## 2022-05-26 NOTE — PHYSICAL EXAM
[de-identified] : Oriented to time, place, person\par Mood: Normal\par Affect: Normal\par Appearance: Healthy, well appearing, no acute distress.\par Gait: Antalgic \par Assistive Devices: None\par \par Left Knee Exam:\par \par Skin: Clean, dry, intact\par Inspection: No obvious malalignment, no masses, +mild swelling, no effusion\par Pulses: 2+ DP/PT pulses \par ROM: 0-120 degrees of flexion. + pain with deep knee flexion.\par Tenderness: +mild MJLT. No LJLT. No pain over the patella facets. No pain to the quadriceps tendon. No pain to the patella tendon. No posterior knee tenderness.\par Stability: Stable to varus, valgus. Negative Lachman testing. Negative anterior drawer, negative posterior drawer.\par Strength: 5/5 Q/H/TA/GS/EHL, without atrophy\par Neuro: Intact to light touch throughout, DTRs normal\par Additional Tests: Negative Cristopher's test, Negative patellar grind test  [de-identified] : The following radiographs were ordered and read by me during this patients visit. I reviewed each radiograph in detail with the patient and discussed the findings as highlighted below. \par \par 4 views of the left knee were obtained today, 05/25/2022, that show no acute fracture or dislocation. There is mild medial, mild lateral and mild patellofemoral degenerative changes seen. There is no significant malalignment. No significant other obvious osseous abnormality, otherwise unremarkable.

## 2022-10-22 RX ORDER — ALBUTEROL SULFATE 90 UG/1
108 (90 BASE) INHALANT RESPIRATORY (INHALATION)
Qty: 1 | Refills: 0 | Status: ACTIVE | COMMUNITY
Start: 2022-10-22 | End: 1900-01-01

## 2022-10-22 RX ORDER — FLUTICASONE FUROATE AND VILANTEROL TRIFENATATE 100; 25 UG/1; UG/1
100-25 POWDER RESPIRATORY (INHALATION) DAILY
Qty: 1 | Refills: 0 | Status: ACTIVE | COMMUNITY
Start: 2022-10-22 | End: 1900-01-01

## 2022-10-24 ENCOUNTER — APPOINTMENT (OUTPATIENT)
Dept: PULMONOLOGY | Facility: CLINIC | Age: 72
End: 2022-10-24

## 2022-10-25 ENCOUNTER — APPOINTMENT (OUTPATIENT)
Dept: PULMONOLOGY | Facility: CLINIC | Age: 72
End: 2022-10-25

## 2022-10-25 VITALS
TEMPERATURE: 97.7 F | HEIGHT: 66 IN | BODY MASS INDEX: 32.78 KG/M2 | RESPIRATION RATE: 16 BRPM | DIASTOLIC BLOOD PRESSURE: 74 MMHG | OXYGEN SATURATION: 96 % | HEART RATE: 65 BPM | SYSTOLIC BLOOD PRESSURE: 132 MMHG | WEIGHT: 204 LBS

## 2022-10-25 DIAGNOSIS — J45.909 UNSPECIFIED ASTHMA, UNCOMPLICATED: ICD-10-CM

## 2022-10-25 PROCEDURE — ZZZZZ: CPT

## 2022-10-25 PROCEDURE — 95012 NITRIC OXIDE EXP GAS DETER: CPT

## 2022-10-25 PROCEDURE — 94010 BREATHING CAPACITY TEST: CPT

## 2022-10-25 PROCEDURE — 94729 DIFFUSING CAPACITY: CPT

## 2022-10-25 PROCEDURE — 71046 X-RAY EXAM CHEST 2 VIEWS: CPT

## 2022-10-25 PROCEDURE — 99214 OFFICE O/P EST MOD 30 MIN: CPT | Mod: 25

## 2022-10-25 PROCEDURE — 94727 GAS DIL/WSHOT DETER LNG VOL: CPT

## 2022-10-25 PROCEDURE — 94618 PULMONARY STRESS TESTING: CPT

## 2022-10-25 RX ORDER — PRASUGREL 5 MG/1
5 TABLET, FILM COATED ORAL
Qty: 30 | Refills: 0 | Status: ACTIVE | COMMUNITY
Start: 2022-08-09

## 2022-10-25 RX ORDER — AZITHROMYCIN 250 MG/1
250 TABLET, FILM COATED ORAL
Qty: 6 | Refills: 0 | Status: DISCONTINUED | COMMUNITY
Start: 2022-10-19

## 2022-10-25 RX ORDER — METOPROLOL SUCCINATE 50 MG/1
50 TABLET, EXTENDED RELEASE ORAL
Qty: 30 | Refills: 0 | Status: ACTIVE | COMMUNITY
Start: 2022-10-20

## 2022-10-25 RX ORDER — EVOLOCUMAB 140 MG/ML
140 INJECTION, SOLUTION SUBCUTANEOUS
Qty: 2 | Refills: 0 | Status: ACTIVE | COMMUNITY
Start: 2021-09-25

## 2022-10-25 RX ORDER — PREDNISONE 10 MG/1
10 TABLET ORAL
Qty: 50 | Refills: 0 | Status: ACTIVE | COMMUNITY
Start: 2022-10-25 | End: 1900-01-01

## 2022-10-25 RX ORDER — AMOXICILLIN AND CLAVULANATE POTASSIUM 875; 125 MG/1; MG/1
875-125 TABLET, COATED ORAL
Qty: 20 | Refills: 0 | Status: ACTIVE | COMMUNITY
Start: 2022-10-25 | End: 1900-01-01

## 2022-10-25 RX ORDER — LOSARTAN POTASSIUM 50 MG/1
50 TABLET, FILM COATED ORAL
Qty: 30 | Refills: 0 | Status: ACTIVE | COMMUNITY
Start: 2022-08-09

## 2022-10-25 RX ORDER — EMPAGLIFLOZIN 25 MG/1
25 TABLET, FILM COATED ORAL
Qty: 30 | Refills: 0 | Status: DISCONTINUED | COMMUNITY
Start: 2022-08-09

## 2022-10-25 RX ORDER — FLUTICASONE FUROATE AND VILANTEROL TRIFENATATE 200; 25 UG/1; UG/1
200-25 POWDER RESPIRATORY (INHALATION)
Qty: 3 | Refills: 1 | Status: ACTIVE | COMMUNITY
Start: 2022-10-25 | End: 1900-01-01

## 2022-10-25 RX ORDER — EZETIMIBE 10 MG/1
10 TABLET ORAL
Qty: 30 | Refills: 0 | Status: DISCONTINUED | COMMUNITY
Start: 2022-10-20

## 2022-10-25 NOTE — ADDENDUM
[FreeTextEntry1] : Documented by PABLO Spencer acting as a scribe for Dr. Priyank Zamora on 10/25/2022 .\par All medical record entries made by the Scribe were at my, Dr. Priyank Zamora's, direction and personally dictated by me on 10/25/2022. I have reviewed the chart and agree that the record accurately reflects my personal performance of the history, physical exam, assessment and plan. I have also personally directed, reviewed, and agree with the discharge instructions. \par \par \par

## 2022-10-25 NOTE — PHYSICAL EXAM
[No Acute Distress] : no acute distress [Normal Oropharynx] : normal oropharynx [II] : Mallampati Class: II [Normal Appearance] : normal appearance [No Neck Mass] : no neck mass [Normal Rate/Rhythm] : normal rate/rhythm [Normal S1, S2] : normal s1, s2 [No Murmurs] : no murmurs [No Resp Distress] : no resp distress [Clear to Auscultation Bilaterally] : clear to auscultation bilaterally [No Abnormalities] : no abnormalities [Benign] : benign [Normal Gait] : normal gait [No Clubbing] : no clubbing [No Cyanosis] : no cyanosis [No Edema] : no edema [FROM] : FROM [Normal Color/ Pigmentation] : normal color/ pigmentation [No Focal Deficits] : no focal deficits [Oriented x3] : oriented x3 [Normal Affect] : normal affect [TextBox_2] : ow [TextBox_68] : I:E ratio 1:3; expiratory wheezes

## 2022-10-25 NOTE — PROCEDURE
[FreeTextEntry1] : CXR reveals a normal sized heart; no evidence of infiltrate or effusion; RMLung inflammatory changed c/w PNA\par \par Full PFT reveals mild restrictive and moderate obstructive dysfunction; FEV1 was 1.64L which is 61% of predicted; low-normal lung volumes; normal diffusion at 17.6, which is 83% of predicted; normal flow volume loop. \par \par 6 minute walk test reveals a low saturation of 95% with no evidence of dyspnea or fatigue; walked 489 meters \par \par FENO was 11; a normal value being less than 25\par Fractional exhaled nitric oxide (FENO) is regarded as a simple, noninvasive method for assessing eosinophilic airway inflammation. Produced by a variety of cells within the lung, nitric oxide (NO) concentrations are generally low in healthy individuals. However, high concentrations of NO appear to be involved in nonspecific host defense mechanisms and chronic inflammatory diseases such as asthma. The American Thor[employer] Society (ATS) therefore has recommended using FENO to aid in the diagnosis and monitoring of eosinophilic airway inflammation and asthma, and for identifying steroid responsive individuals whose chronic respiratory symptoms may be caused by airway inflammation.

## 2022-10-25 NOTE — ASSESSMENT
[FreeTextEntry1] : Mr. Kapoor is a 72 year old male with a history of DM, hypothyroid, MI, idiopathic urticaria, asthma, allergic rhinitis, GERD who returns for follow-up pulmonary evaluation. His number one issues post URI asthma\par \par ***************************************PRE-OP CLEARANCE for aortic aneurysm repair***************************************\par -at this point in time there are no absolute pulmonary contraindications to go forward with the planned procedure\par -at the time of surgery he should have optimal pain control, incentive spirometry, early ambulation, DVT and GI prophylaxis --Optimally would delay surgery 1-2 weeks to optimally heal from PNA Right lung\par \par Problem  1: cough -active\par His cough is multifactorial due to:\par -PND/allergy\par -asthma- #1is PNA Right sided 10/2022\par -LPR (reflux)\par -PNA\par \par Problem 1A: PNA-R-lung\par -add Augmentin 875 mg BID \par -complete f/u CXR in 2-3 weeks\par Chest X-Ray/CT revealed an infiltrate c/w pneumonia; this based on the patient's clinical scenario required additional therapy. Any change in patient's status will require hospitalization at the nearest hospital and a local ambulance will need to be called. Our group is on staff at [facility] and [facility] as consultants. The patient/family is instructed to notify our office with any change in condition. \par \par Problem  2: Allergic Rhinitis / sinus\par -continue Xlear/ Boroleum \par -continue Olopatadine  0.6% 1 sniff each nostril BID\par -continue Clarinex 5 mg before bed / Xyzal 5mg QHs\par -continue Mometasone cream (.15) PRN for eczema \par \par Environmental measures for allergies were encouraged including mattress and pillow cover, air purifier, and environmental controls.\par \par Problem 3: Asthma (active)\par -continue Singulair 10 mg QHS\par -add Breo Ellipta 200 at 1 inhalation QD \par -add Prednisone 20 mg x 7 days, 10 mg x 7 days \par -Information sheet given to the patient to be reviewed, this medication is never to be used without consulting the prescribing physician. Proper dietary restraint is necessary specifically salt containing foods, if any reaction may occur should be reported.  \par -continue Ventolin 2 inhalations PRN up to Q6H and pre-exercise \par \par Asthma is believed to be caused by inherited (genetic) and environmental factor, but its exact cause is unknown. Asthma may be triggered by allergens, lung infections, or irritants in the air. Asthma triggers are different for each person \par -Inhaler technique reviewed as well as oral hygiene techniques reviewed with patient. Avoidance of cold air, extremes of temperature, rescue inhaler should be used before exercise. Order of medication reviewed with patient. Recommended use of a cool mist humidifier in the bedroom. \par \par Problem 4: GERD\par -Continue Pepcid 40 mg QHS \par \par -Rule of 2s: avoid eating too much, eating too fast, eating too late, eating too spicy, eating two hours before bed\par -Things to avoid including overeating, spicy foods, tight clothing, eating within three hours of bed, this list is not all inclusive. \par -For treatment of reflux, possible options discussed including diet control, H2 blockers, PPIs, as well as coating motility agents discussed as treatment options. Timing of meals and proximity of last meal to sleep were discussed. If symptoms persist, a formal gastrointestinal evaluation is needed.  \par \par Problem 5: Chronic idiopathic urticaria (winter eczema)\par -s/p blood work for allergies: asthma panel, food IgE level, eosinophil level, Vitamin E level (all normal)\par -recommended Borage with flax seed oil\par \par Problem 6: primary snoring ?ZI\par -Recommend OxyAid / snoring chin strap\par -Sleep apnea is associated with adverse clinical consequences which can affect most organ systems.\par Cardiovascular disease risk includes arrhythmias, atrial fibrillation, hypertension, coronary artery disease, and stroke. Metabolic disorders include diabetes type 2, non-alcoholic fatty liver disease. Mood disorder especially depression; and cognitive decline especially in the elderly. Associations with chronic reflux/Kay’s esophagus some but not all inclusive.\par -Reasons include arousal consistent with hypopnea; respiratory events most prominent in REM sleep or supine position; therefore sleep staging and body position are important for accurate diagnosis and estimation of AHI. \par \par Problem 7: Dysphonia\par -Recommend good oral hygiene\par -continue Nystatin 10 cc up to 3 times per day PRN \par \par Problem 8: cardiac\par -Follow-up with Dr. Garcia Mann\par \par Problem 9: Health Maintenance/COVID19 Precautions:\par -s/p COVID-19 spring 2022\par - Clean your hands often. Wash your hands often with soap and water for at least 20 seconds, especially after blowing your nose, coughing, or sneezing, or having been in a public place.\par - If soap and water are not available, use a hand  that contains at least 60% alcohol.\par - To the extent possible, avoid touching high-touch surfaces in public places - elevator buttons, door handles, handrails, handshaking with people, etc. Use a tissue or your sleeve to cover your hand or finger if you must touch something.\par - Wash your hands after touching surfaces in public places.\par - Avoid touching your face, nose, eyes, etc.\par - Clean and disinfect your home to remove germs: practice routine cleaning of frequently touched surfaces (for example: tables, doorknobs, light switches, handles, desks, toilets, faucets, sinks & cell phones)\par - Avoid crowds, especially in poorly ventilated spaces. Your risk of exposure to respiratory viruses like COVID-19 may increase in crowded, closed-in settings with little air circulation if there are people in the crowd who are sick. All patients are recommended to practice social distancing and stay at least 6 feet away from others.\par \par Immune Support Recommendations:\par -OTC Vitamin C 500mg BID \par -OTC Quercetin 250-500mg BID \par -OTC Zinc 75-100mg per day \par -OTC Melatonin 1 or 2 mg a night \par -OTC Vitamin D 1-4000mg per day \par -OTC Tonic Water 8oz per day\par \par \par Problem 10 : health maintenance\par s/p flu shot 2020\par -recommended strep pneumonia vaccines: Prevnar-13 vaccine, followed by Pneumo vaccine 23 one year following (completed)\par -recommended early intervention for URIs\par -recommended regular osteoporosis evaluations\par -recommended early dermatological evaluations\par -recommended after the age of 50 to the age of 70, colonoscopy every 5 years \par \par Return to office in 4 months\par Call for any questions, changes, or concerns

## 2022-10-25 NOTE — HISTORY OF PRESENT ILLNESS
[FreeTextEntry1] : Mr. DAVIES is a 72 year year old male with a history of asthma, allergic rhinitis, GERD, dysphonia, snoring  who presents for a follow-up pulmonary evaluation. His chief complaint is \par \par -s/p COVID-19 4/2022\par -he notes catching a cold last weekend and Sx onset last Tuesday\par -he notes Abx since Wednesday ([place])\par -he notes breathing deteriorated over weekend\par -he notes hemoptysis\par -he notes coughing fit over weekend\par -he notes pending aortic aneurysm surgery\par -he notes chronic progressive lower and middle back pain \par -he notes congested sinuses\par -he notes headaches due to illness which are improving\par -he notes weight is stable \par -he notes interrupted sleep\par -he notes snoring\par -he notes waking up rested\par -he denies taking any new medications, vitamins, or supplements \par -he notes urticaria has resolved\par \par -he denies any nausea, vomiting, fever, chills, sweats, chest pain, chest pressure, wheezing, palpitations, constipation, diarrhea, dizziness, dysphagia, heartburn, reflux, itchy eyes, itchy ears, leg swelling, leg pain, arthralgias, or sour taste in the mouth.

## 2022-12-30 ENCOUNTER — APPOINTMENT (OUTPATIENT)
Dept: RADIOLOGY | Facility: CLINIC | Age: 72
End: 2022-12-30
Payer: MEDICARE

## 2022-12-30 ENCOUNTER — OUTPATIENT (OUTPATIENT)
Dept: OUTPATIENT SERVICES | Facility: HOSPITAL | Age: 72
LOS: 1 days | End: 2022-12-30
Payer: MEDICARE

## 2022-12-30 DIAGNOSIS — Z11.59 ENCOUNTER FOR SCREENING FOR OTHER VIRAL DISEASES: ICD-10-CM

## 2022-12-30 DIAGNOSIS — J18.9 PNEUMONIA, UNSPECIFIED ORGANISM: ICD-10-CM

## 2022-12-30 PROCEDURE — 71046 X-RAY EXAM CHEST 2 VIEWS: CPT

## 2022-12-30 PROCEDURE — 71046 X-RAY EXAM CHEST 2 VIEWS: CPT | Mod: 26

## 2022-12-30 RX ORDER — PREDNISONE 10 MG/1
10 TABLET ORAL
Qty: 21 | Refills: 0 | Status: ACTIVE | COMMUNITY
Start: 2022-12-30 | End: 1900-01-01

## 2022-12-31 ENCOUNTER — NON-APPOINTMENT (OUTPATIENT)
Age: 72
End: 2022-12-31

## 2022-12-31 LAB
RAPID RVP RESULT: DETECTED
RSV RNA SPEC QL NAA+PROBE: DETECTED
SARS-COV-2 RNA PNL RESP NAA+PROBE: NOT DETECTED

## 2023-01-03 ENCOUNTER — NON-APPOINTMENT (OUTPATIENT)
Age: 73
End: 2023-01-03

## 2023-02-09 ENCOUNTER — APPOINTMENT (OUTPATIENT)
Dept: PULMONOLOGY | Facility: CLINIC | Age: 73
End: 2023-02-09
Payer: MEDICARE

## 2023-02-09 DIAGNOSIS — R06.83 SNORING: ICD-10-CM

## 2023-02-09 DIAGNOSIS — J30.9 ALLERGIC RHINITIS, UNSPECIFIED: ICD-10-CM

## 2023-02-09 DIAGNOSIS — J45.909 UNSPECIFIED ASTHMA, UNCOMPLICATED: ICD-10-CM

## 2023-02-09 DIAGNOSIS — Z87.01 PERSONAL HISTORY OF PNEUMONIA (RECURRENT): ICD-10-CM

## 2023-02-09 DIAGNOSIS — K21.9 GASTRO-ESOPHAGEAL REFLUX DISEASE W/OUT ESOPHAGITIS: ICD-10-CM

## 2023-02-09 DIAGNOSIS — Z86.19 PERSONAL HISTORY OF OTHER INFECTIOUS AND PARASITIC DISEASES: ICD-10-CM

## 2023-02-09 DIAGNOSIS — Z71.89 OTHER SPECIFIED COUNSELING: ICD-10-CM

## 2023-02-09 DIAGNOSIS — L50.1 IDIOPATHIC URTICARIA: ICD-10-CM

## 2023-02-09 PROCEDURE — 99214 OFFICE O/P EST MOD 30 MIN: CPT | Mod: 25

## 2023-02-09 PROCEDURE — 95012 NITRIC OXIDE EXP GAS DETER: CPT

## 2023-02-09 PROCEDURE — 94010 BREATHING CAPACITY TEST: CPT

## 2023-02-09 NOTE — PHYSICAL EXAM
[No Acute Distress] : no acute distress [Normal Oropharynx] : normal oropharynx [II] : Mallampati Class: II [Normal Appearance] : normal appearance [No Neck Mass] : no neck mass [Normal Rate/Rhythm] : normal rate/rhythm [Normal S1, S2] : normal s1, s2 [No Murmurs] : no murmurs [No Resp Distress] : no resp distress [Clear to Auscultation Bilaterally] : clear to auscultation bilaterally [No Abnormalities] : no abnormalities [Benign] : benign [Normal Gait] : normal gait [No Clubbing] : no clubbing [No Cyanosis] : no cyanosis [No Edema] : no edema [FROM] : FROM [Normal Color/ Pigmentation] : normal color/ pigmentation [No Focal Deficits] : no focal deficits [Oriented x3] : oriented x3 [Normal Affect] : normal affect [TextBox_2] : ow [TextBox_68] : I:E ratio 1:3; clear, decreased breath sounds at the right base

## 2023-02-09 NOTE — ASSESSMENT
[FreeTextEntry1] : Mr. 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 His number one issues are back/neck\par \par Problem  1: cough - resolved\par His cough is multifactorial due to:\par -PND/allergy\par -asthma- #1is PNA Right sided 10/2022\par -LPR (reflux)\par -PNA\par \par Problem 1A: PNA-R-lung - resolved 10/2022\par -add Augmentin 875 mg BID - completed\par -s/p f/u CXR in 2-3 weeks\par \par Problem  2: Allergic Rhinitis / sinus\par -continue Xlear/ Boroleum \par -continue Olopatadine  0.6% 1 sniff each nostril BID\par -continue Clarinex 5 mg before bed / Xyzal 5mg QHs\par -continue Mometasone cream (.15) PRN for eczema \par \par Environmental measures for allergies were encouraged including mattress and pillow cover, air purifier, and environmental controls.\par \par Problem 3: Asthma (controlled)\par -continue Singulair 10 mg QHS\par -add Breo Ellipta 200 at 1 inhalation QD \par -s/p Prednisone 20 mg x 7 days, 10 mg x 7 days (10/2022)\par -Information sheet given to the patient to be reviewed, this medication is never to be used without consulting the prescribing physician. Proper dietary restraint is necessary specifically salt containing foods, if any reaction may occur should be reported.  \par -continue Ventolin 2 inhalations PRN up to Q6H and pre-exercise \par \par Asthma is believed to be caused by inherited (genetic) and environmental factor, but its exact cause is unknown. Asthma may be triggered by allergens, lung infections, or irritants in the air. Asthma triggers are different for each person \par -Inhaler technique reviewed as well as oral hygiene techniques reviewed with patient. Avoidance of cold air, extremes of temperature, rescue inhaler should be used before exercise. Order of medication reviewed with patient. Recommended use of a cool mist humidifier in the bedroom. \par \par Problem 4: GERD\par -Continue Pepcid 40 mg QHS \par \par -Rule of 2s: avoid eating too much, eating too fast, eating too late, eating too spicy, eating two hours before bed\par -Things to avoid including overeating, spicy foods, tight clothing, eating within three hours of bed, this list is not all inclusive. \par -For treatment of reflux, possible options discussed including diet control, H2 blockers, PPIs, as well as coating motility agents discussed as treatment options. Timing of meals and proximity of last meal to sleep were discussed. If symptoms persist, a formal gastrointestinal evaluation is needed.  \par \par Problem 5: Chronic idiopathic urticaria (winter eczema)\par -s/p blood work for allergies: asthma panel, food IgE level, eosinophil level, Vitamin E level (all normal)\par -recommended Borage with flax seed oil\par \par Problem 6: primary snoring ?ZI\par -Recommend OxyAid / snoring chin strap\par -Sleep apnea is associated with adverse clinical consequences which can affect most organ systems.\par Cardiovascular disease risk includes arrhythmias, atrial fibrillation, hypertension, coronary artery disease, and stroke. Metabolic disorders include diabetes type 2, non-alcoholic fatty liver disease. Mood disorder especially depression; and cognitive decline especially in the elderly. Associations with chronic reflux/Kay’s esophagus some but not all inclusive.\par -Reasons include arousal consistent with hypopnea; respiratory events most prominent in REM sleep or supine position; therefore sleep staging and body position are important for accurate diagnosis and estimation of AHI. \par \par Problem 7: Dysphonia - resolved\par -Recommend good oral hygiene\par -continue Nystatin 10 cc up to 3 times per day PRN \par \par Problem 8: cardiac\par -Follow-up with Dr. Garcia Mann\par \par Problem 9: Health Maintenance/COVID19 Precautions:\par -recommended Sanotize anti viral nasal spray in case of viral infection \par -s/p COVID-19 spring 2022\par - Clean your hands often. Wash your hands often with soap and water for at least 20 seconds, especially after blowing your nose, coughing, or sneezing, or having been in a public place.\par - If soap and water are not available, use a hand  that contains at least 60% alcohol.\par - To the extent possible, avoid touching high-touch surfaces in public places - elevator buttons, door handles, handrails, handshaking with people, etc. Use a tissue or your sleeve to cover your hand or finger if you must touch something.\par - Wash your hands after touching surfaces in public places.\par - Avoid touching your face, nose, eyes, etc.\par - Clean and disinfect your home to remove germs: practice routine cleaning of frequently touched surfaces (for example: tables, doorknobs, light switches, handles, desks, toilets, faucets, sinks & cell phones)\par - Avoid crowds, especially in poorly ventilated spaces. Your risk of exposure to respiratory viruses like COVID-19 may increase in crowded, closed-in settings with little air circulation if there are people in the crowd who are sick. All patients are recommended to practice social distancing and stay at least 6 feet away from others.\par \par Immune Support Recommendations:\par -OTC Vitamin C 500mg BID \par -OTC Quercetin 250-500mg BID \par -OTC Zinc 75-100mg per day \par -OTC Melatonin 1 or 2 mg a night \par -OTC Vitamin D 1-4000mg per day \par -OTC Tonic Water 8oz per day\par \par \par Problem 10 : health maintenance\par -Recommended [employer] \par s/p flu shot 2022\par -recommended strep pneumonia vaccines: Prevnar-13 vaccine, followed by Pneumo vaccine 23 one year following (completed)\par -recommended early intervention for URIs\par -recommended regular osteoporosis evaluations\par -recommended early dermatological evaluations\par -recommended after the age of 50 to the age of 70, colonoscopy every 5 years \par \par Return to office in 4 months\par Call for any questions, changes, or concerns

## 2023-02-09 NOTE — ADDENDUM
[FreeTextEntry1] : Documented by Jim Cardoza acting as a scribe for Dr. Priyank Zamora on 02/09/2023.\par \par All medical record entries made by the Scribe were at my, Dr. Priyank Zamora's, direction and personally dictated by me on 02/09/2023. I have reviewed the chart and agree that the record accurately reflects my personal performance of the history, physical exam, assessment and plan. I have also personally directed, reviewed, and agree with the discharge instructions.

## 2023-02-09 NOTE — HISTORY OF PRESENT ILLNESS
[FreeTextEntry1] : Mr. DAVIES is a 73 year year old male with a history of asthma, allergic rhinitis, GERD, dysphonia, snoring  who presents for a follow-up pulmonary evaluation. His chief complaint is \par \par -he notes feeling generally well \par -he notes he had an aortic aneurism repaired\par -he notes two additional aneurism repairs\par -he denies any acute visual issues \par -he feels that his heart is generally doing better\par -he notes itchy ears\par -he notes his bowels are regular \par -s/p RSV\par -he notes stretching his back in the morning\par -he notes that he is sleeping well\par -he notes herniated discs in neck\par -he notes being unable to sleep on his back\par -he notes he only sleeps on is sides\par \par -patient denies any headaches, nausea, vomiting, fever, chills, sweats, chest pain, chest pressure, palpitations, coughing, wheezing, fatigue, diarrhea, constipation, dysphagia, dizziness, leg swelling, leg pain, itchy eyes, heartburn, reflux or sour taste in the mouth

## 2023-02-09 NOTE — PROCEDURE
[FreeTextEntry1] : Feno was 13; a normal value being less than 25. Fractional exhaled nitric oxide (FENO) is regarded as a simple, noninvasive method for assessing eosinophilic airway inflammation. Produced by a variety of cells within the lung, nitric oxide (NO) concentrations are generally low in healthy individuals. However, high concentrations of NO appear to be involved in nonspecific host defense mechanisms and chronic inflammatory  diseases such as asthma. The American Thor[employer] Society (ATS) therefore recommended using FENO to aid in the diagnosis and monitoring of eosinophilic airway inflammation and asthma, and for identifying steroid responsive individuals whose chronic respiratory symptoms may be caused by airway inflammation \par \par CXR (12/30/2022) revealed no focal consolidation.\par \par PFT revealed normal flows, with a FEV1 of 2.75L, which is 109% of predicted, with a normal flow volume loop

## 2023-09-12 ENCOUNTER — APPOINTMENT (OUTPATIENT)
Dept: PULMONOLOGY | Facility: CLINIC | Age: 73
End: 2023-09-12

## 2023-10-18 ENCOUNTER — APPOINTMENT (OUTPATIENT)
Dept: PULMONOLOGY | Facility: CLINIC | Age: 73
End: 2023-10-18

## 2023-10-25 ENCOUNTER — APPOINTMENT (OUTPATIENT)
Dept: PULMONOLOGY | Facility: CLINIC | Age: 73
End: 2023-10-25

## 2023-10-26 ENCOUNTER — APPOINTMENT (OUTPATIENT)
Dept: PULMONOLOGY | Facility: CLINIC | Age: 73
End: 2023-10-26
Payer: SELF-PAY

## 2023-10-26 PROCEDURE — SNS01: CPT
